# Patient Record
Sex: MALE | Race: WHITE | NOT HISPANIC OR LATINO | Employment: FULL TIME | URBAN - NONMETROPOLITAN AREA
[De-identification: names, ages, dates, MRNs, and addresses within clinical notes are randomized per-mention and may not be internally consistent; named-entity substitution may affect disease eponyms.]

---

## 2017-01-23 ENCOUNTER — OFFICE VISIT (OUTPATIENT)
Dept: FAMILY MEDICINE CLINIC | Facility: CLINIC | Age: 52
End: 2017-01-23

## 2017-01-23 VITALS
TEMPERATURE: 97.8 F | HEIGHT: 66 IN | HEART RATE: 71 BPM | BODY MASS INDEX: 34.81 KG/M2 | SYSTOLIC BLOOD PRESSURE: 140 MMHG | OXYGEN SATURATION: 96 % | WEIGHT: 216.6 LBS | DIASTOLIC BLOOD PRESSURE: 80 MMHG

## 2017-01-23 DIAGNOSIS — J06.9 ACUTE URI: Primary | ICD-10-CM

## 2017-01-23 DIAGNOSIS — R05.9 COUGH: ICD-10-CM

## 2017-01-23 DIAGNOSIS — R50.9 FEVER, UNSPECIFIED FEVER CAUSE: ICD-10-CM

## 2017-01-23 LAB
FLUAV AG NPH QL: NEGATIVE
FLUBV AG NPH QL IA: NEGATIVE

## 2017-01-23 PROCEDURE — 99213 OFFICE O/P EST LOW 20 MIN: CPT | Performed by: NURSE PRACTITIONER

## 2017-01-23 PROCEDURE — 87804 INFLUENZA ASSAY W/OPTIC: CPT | Performed by: NURSE PRACTITIONER

## 2017-01-23 RX ORDER — AZITHROMYCIN 250 MG/1
TABLET, FILM COATED ORAL
Qty: 6 TABLET | Refills: 0 | Status: SHIPPED | OUTPATIENT
Start: 2017-01-23 | End: 2017-05-02

## 2017-01-23 RX ORDER — METHYLPREDNISOLONE 4 MG/1
TABLET ORAL
Qty: 21 TABLET | Refills: 0 | Status: SHIPPED | OUTPATIENT
Start: 2017-01-23 | End: 2017-04-11

## 2017-01-23 RX ORDER — PROMETHAZINE HYDROCHLORIDE AND CODEINE PHOSPHATE 6.25; 1 MG/5ML; MG/5ML
5 SYRUP ORAL EVERY 4 HOURS PRN
Qty: 240 ML | Refills: 0 | Status: SHIPPED | OUTPATIENT
Start: 2017-01-23 | End: 2017-04-11

## 2017-01-23 NOTE — PROGRESS NOTES
Subjective   Sharan Gomez is a 51 y.o. male. Patient here today with complaints of URI  pt here today with complaints of cough, started last week but worsened on Saturday. Has had low grade fever, did not have flu shot, is having body aches, cough is nonproductive, having headache. Does not smoke.     Vitals:    01/23/17 1410   BP: 140/80   Pulse: 71   Temp: 97.8 °F (36.6 °C)   SpO2: 96%     Past Medical History   Diagnosis Date   • Elevated blood pressure    • Encounter for general adult medical examination without abnormal findings    • Essential hypertension    • Hyperlipidemia    • Kidney stone    • Left flank pain    • Low back pain    • Muscle strain    • Type 2 diabetes mellitus      URI    This is a new problem. The current episode started in the past 7 days. The problem has been gradually worsening. There has been no fever. Associated symptoms include congestion, coughing, headaches, rhinorrhea and a sore throat. Pertinent negatives include no abdominal pain, chest pain, diarrhea, dysuria, ear pain, joint pain, joint swelling, nausea, neck pain, plugged ear sensation, rash, sinus pain, sneezing, swollen glands, vomiting or wheezing. He has tried nothing for the symptoms. The treatment provided no relief.   Cough   This is a new problem. The current episode started in the past 7 days. The problem has been gradually worsening. The problem occurs every few minutes. The cough is non-productive. Associated symptoms include a fever, headaches, myalgias, nasal congestion, rhinorrhea and a sore throat. Pertinent negatives include no chest pain, chills, ear congestion, ear pain, heartburn, hemoptysis, postnasal drip, rash, shortness of breath, sweats, weight loss or wheezing. Nothing aggravates the symptoms. He has tried nothing for the symptoms. The treatment provided no relief. His past medical history is significant for pneumonia. There is no history of asthma, bronchiectasis, bronchitis, COPD, emphysema or  environmental allergies.        The following portions of the patient's history were reviewed and updated as appropriate: allergies, current medications, past family history, past medical history, past social history, past surgical history and problem list.    Review of Systems   Constitutional: Positive for fever. Negative for chills and weight loss.   HENT: Positive for congestion, rhinorrhea and sore throat. Negative for ear pain, postnasal drip and sneezing.    Eyes: Negative.    Respiratory: Positive for cough. Negative for hemoptysis, shortness of breath and wheezing.    Cardiovascular: Negative.  Negative for chest pain.   Gastrointestinal: Negative.  Negative for abdominal pain, diarrhea, heartburn, nausea and vomiting.   Endocrine: Negative.    Genitourinary: Negative.  Negative for dysuria.   Musculoskeletal: Positive for myalgias. Negative for joint pain and neck pain.   Skin: Negative.  Negative for rash.   Allergic/Immunologic: Negative.  Negative for environmental allergies.   Neurological: Positive for headaches.   Hematological: Negative.    Psychiatric/Behavioral: Negative.        Objective   Physical Exam   Constitutional: He is oriented to person, place, and time. He appears well-developed and well-nourished. No distress.   HENT:   Head: Normocephalic and atraumatic.   Right Ear: Hearing, external ear and ear canal normal. No drainage, swelling or tenderness. A middle ear effusion is present. No decreased hearing is noted.   Left Ear: Hearing, external ear and ear canal normal. No drainage, swelling or tenderness. A middle ear effusion is present. No decreased hearing is noted.   Nose: Mucosal edema present. Right sinus exhibits no maxillary sinus tenderness and no frontal sinus tenderness. Left sinus exhibits no maxillary sinus tenderness and no frontal sinus tenderness.   Mouth/Throat: Uvula is midline and mucous membranes are normal. Posterior oropharyngeal erythema present. No tonsillar  exudate.   Eyes: Right eye exhibits no discharge. Left eye exhibits no discharge.   Neck: Neck supple.   Cardiovascular: Normal rate, regular rhythm and normal heart sounds.  Exam reveals no gallop and no friction rub.    No murmur heard.  Pulmonary/Chest: Effort normal. No respiratory distress. He has decreased breath sounds in the right upper field, the right middle field, the right lower field, the left upper field, the left middle field and the left lower field. He has no wheezes. He has no rales.   Musculoskeletal: Normal range of motion.   Lymphadenopathy:     He has cervical adenopathy.   Neurological: He is alert and oriented to person, place, and time.   Skin: Skin is warm and dry. No rash noted. He is not diaphoretic. No erythema. No pallor.   Psychiatric: He has a normal mood and affect. His behavior is normal. Judgment and thought content normal.   Nursing note and vitals reviewed.      Assessment/Plan   Sharan was seen today for uri.    Diagnoses and all orders for this visit:    Acute URI    Fever, unspecified fever cause  -     Cancel: Influenza A & B, RT PCR  -     Cancel: Influenza Antigen; Future  -     Influenza Antigen  -     Influenza Antigen  -     XR Chest PA & Lateral    Cough    Other orders  -     azithromycin (ZITHROMAX Z-ELISA) 250 MG tablet; Take 2 tablets the first day, then 1 tablet daily for 4 days.  -     MethylPREDNISolone (MEDROL, ELISA,) 4 MG tablet; Take as directed on package instructions.  -     promethazine-codeine (PHENERGAN with CODEINE) 6.25-10 MG/5ML syrup; Take 5 mL by mouth Every 4 (Four) Hours As Needed for cough.        Flu swab is obtained and is negative.  I will obtain chest x-ray.  I will treat him with Zithromax as well as prednisone.  I will advised that he not work today and push fluids, rest.  If his symptoms should persist or worsen he is to return to clinic for follow-up.  He is aware.  Will otherwise return to clinic as scheduled for chronic conditions.  All  questions and concerns are addressed with understanding noted. The patient is in agreement to above plan.

## 2017-01-23 NOTE — MR AVS SNAPSHOT
Sharan Gomez   1/23/2017 2:15 PM   Office Visit    Dept Phone:  [phone]   Encounter #:  [id number]    Provider:  BRYANT Booth   Department:  [facility]                Your Full Care Plan              Today's Medication Changes          These changes are accurate as of: 1/23/17  3:14 PM.  If you have any questions, ask your nurse or doctor.               New Medication(s)Ordered:     azithromycin 250 MG tablet   Commonly known as:  ZITHROMAX Z-ELISA   Take 2 tablets the first day, then 1 tablet daily for 4 days.   Started by:  BRYANT Booth       MethylPREDNISolone 4 MG tablet   Commonly known as:  MEDROL (ELISA)   Take as directed on package instructions.   Started by:  BRYANT Booth       promethazine-codeine 6.25-10 MG/5ML syrup   Commonly known as:  PHENERGAN with CODEINE   Take 5 mL by mouth Every 4 (Four) Hours As Needed for cough.   Started by:  BRYANT Booth            Where to Get Your Medications      These medications were sent to [facility] [address] [phone] [place] [phone] [address] [address]     Phone:  [phone]     azithromycin 250 MG tablet    MethylPREDNISolone 4 MG tablet         You can get these medications from any pharmacy     Bring a paper prescription for each of these medications     promethazine-codeine 6.25-10 MG/5ML syrup                  Your Updated Medication List          This list is accurate as of: 1/23/17  3:14 PM.  Always use your most recent med list.                ACURA BLOOD GLUCOSE METER W/DEVICE kit       atorvastatin 20 MG tablet   Commonly known as:  LIPITOR   Take 1 tablet by mouth daily.       azithromycin 250 MG tablet   Commonly known as:  ZITHROMAX Z-ELISA   Take 2 tablets the first day, then 1 tablet daily for 4 days.       glimepiride 4 MG tablet   Commonly known as:  AMARYL       glucose blood test  strip       Lancets misc       MethylPREDNISolone 4 MG tablet   Commonly known as:  MEDROL (ELISA)   Take as directed on package instructions.       promethazine-codeine 6.25-10 MG/5ML syrup   Commonly known as:  PHENERGAN with CODEINE   Take 5 mL by mouth Every 4 (Four) Hours As Needed for cough.       valsartan 80 MG tablet   Commonly known as:  DIOVAN   Take 1 tablet by mouth daily.               We Performed the Following     Influenza Antigen     Influenza Antigen     XR Chest PA & Lateral       You Were Diagnosed With        Codes Comments    Fever, unspecified fever cause    -  Primary ICD-10-CM: R50.9  ICD-9-CM: 780.60       Medications to be Given to You by a Medical Professional     Due       Frequency    10/6/2016 cyanocobalamin injection 1,000 mcg  Every 28 Days      Instructions     None    Patient Instructions History      Upcoming Appointments     Visit Type Date Time Department    OFFICE VISIT 2017  2:15 PM [facility]      Picwing Signup     [facility] Picwing allows you to send messages to your doctor, view your test results, renew your prescriptions, schedule appointments, and more. To sign up, go to EverTrue and click on the Sign Up Now link in the New User? box. Enter your Picwing Activation Code exactly as it appears below along with the last four digits of your Social Security Number and your Date of Birth () to complete the sign-up process. If you do not sign up before the expiration date, you must request a new code.    Picwing Activation Code: [email or web address]  Expires: 2017  3:14 PM    If you have questions, you can email [email or web address] or call [phone] to talk to our Picwing staff. Remember, Picwing is NOT to be used for urgent needs. For medical emergencies, dial 911.               Other Info from Your Visit           Allergies     Avelox [Moxifloxacin]      Crestor [Rosuvastatin Calcium]      Pravachol [Pravastatin Sodium]       "  Reason for Visit     URI           Vital Signs     Blood Pressure Pulse Temperature Height Weight Oxygen Saturation    140/80 71 97.8 °F (36.6 °C) 66\" (167.6 cm) 216 lb 9.6 oz (98.2 kg) 96%    Body Mass Index Smoking Status                34.96 kg/m2 Never Smoker          Problems and Diagnoses Noted     Fever    -  Primary      Results         "

## 2017-04-25 DIAGNOSIS — E11.9 TYPE 2 DIABETES MELLITUS WITHOUT COMPLICATION, WITHOUT LONG-TERM CURRENT USE OF INSULIN (HCC): Primary | ICD-10-CM

## 2017-04-28 LAB
ALBUMIN SERPL-MCNC: 4.8 G/DL (ref 3.2–5.5)
ALBUMIN/GLOB SERPL: 1.4 G/DL (ref 1–3)
ALP SERPL-CCNC: 73 U/L (ref 15–121)
ALT SERPL W P-5'-P-CCNC: 39 U/L (ref 10–60)
ANION GAP SERPL CALCULATED.3IONS-SCNC: 9 MMOL/L (ref 5–15)
AST SERPL-CCNC: 27 U/L (ref 10–60)
BASOPHILS # BLD AUTO: 0.05 10*3/MM3 (ref 0–0.2)
BASOPHILS NFR BLD AUTO: 0.4 % (ref 0–2)
BILIRUB SERPL-MCNC: 3 MG/DL (ref 0.2–1)
BUN BLD-MCNC: 19 MG/DL (ref 8–25)
BUN/CREAT SERPL: 19 (ref 7–25)
CALCIUM SPEC-SCNC: 9.3 MG/DL (ref 8.4–10.8)
CHLORIDE SERPL-SCNC: 102 MMOL/L (ref 100–112)
CHOLEST SERPL-MCNC: 125 MG/DL (ref 150–200)
CO2 SERPL-SCNC: 29 MMOL/L (ref 20–32)
CREAT BLD-MCNC: 1 MG/DL (ref 0.4–1.3)
DEPRECATED RDW RBC AUTO: 39.2 FL (ref 35.1–43.9)
EOSINOPHIL # BLD AUTO: 0.19 10*3/MM3 (ref 0–0.7)
EOSINOPHIL NFR BLD AUTO: 1.7 % (ref 0–7)
ERYTHROCYTE [DISTWIDTH] IN BLOOD BY AUTOMATED COUNT: 12.4 % (ref 11.5–14.5)
GFR SERPL CREATININE-BSD FRML MDRD: 79 ML/MIN/1.73 (ref 63–147)
GLOBULIN UR ELPH-MCNC: 3.5 GM/DL (ref 2.5–4.6)
GLUCOSE BLD-MCNC: 99 MG/DL (ref 70–100)
HBA1C MFR BLD: 7.14 % (ref 4–5.6)
HCT VFR BLD AUTO: 47 % (ref 39–49)
HDLC SERPL-MCNC: 39 MG/DL (ref 35–100)
HGB BLD-MCNC: 16.4 G/DL (ref 13.7–17.3)
LDLC SERPL CALC-MCNC: 75 MG/DL
LDLC/HDLC SERPL: 1.92 {RATIO}
LYMPHOCYTES # BLD AUTO: 3.45 10*3/MM3 (ref 0.6–4.2)
LYMPHOCYTES NFR BLD AUTO: 30.4 % (ref 10–50)
MCH RBC QN AUTO: 30.4 PG (ref 26.5–34)
MCHC RBC AUTO-ENTMCNC: 34.9 G/DL (ref 31.5–36.3)
MCV RBC AUTO: 87.2 FL (ref 80–98)
MONOCYTES # BLD AUTO: 1.28 10*3/MM3 (ref 0–0.9)
MONOCYTES NFR BLD AUTO: 11.3 % (ref 0–12)
NEUTROPHILS # BLD AUTO: 6.38 10*3/MM3 (ref 2–8.6)
NEUTROPHILS NFR BLD AUTO: 56.2 % (ref 37–80)
PLATELET # BLD AUTO: 270 10*3/MM3 (ref 150–450)
PMV BLD AUTO: 9.4 FL (ref 8–12)
POTASSIUM BLD-SCNC: 4 MMOL/L (ref 3.4–5.4)
PROT SERPL-MCNC: 8.3 G/DL (ref 6.7–8.2)
RBC # BLD AUTO: 5.39 10*6/MM3 (ref 4.37–5.74)
SODIUM BLD-SCNC: 140 MMOL/L (ref 134–146)
T4 SERPL-MCNC: 10.5 MCG/DL (ref 5.5–11)
TRIGL SERPL-MCNC: 56 MG/DL (ref 35–160)
TSH SERPL DL<=0.05 MIU/L-ACNC: 1.38 MIU/ML (ref 0.46–4.68)
VLDLC SERPL-MCNC: 11.2 MG/DL
WBC NRBC COR # BLD: 11.35 10*3/MM3 (ref 3.2–9.8)

## 2017-04-28 PROCEDURE — 84481 FREE ASSAY (FT-3): CPT | Performed by: NURSE PRACTITIONER

## 2017-04-28 PROCEDURE — 80061 LIPID PANEL: CPT | Performed by: NURSE PRACTITIONER

## 2017-04-28 PROCEDURE — 80053 COMPREHEN METABOLIC PANEL: CPT | Performed by: NURSE PRACTITIONER

## 2017-04-28 PROCEDURE — 85025 COMPLETE CBC W/AUTO DIFF WBC: CPT | Performed by: NURSE PRACTITIONER

## 2017-04-28 PROCEDURE — 84443 ASSAY THYROID STIM HORMONE: CPT | Performed by: NURSE PRACTITIONER

## 2017-04-28 PROCEDURE — 36415 COLL VENOUS BLD VENIPUNCTURE: CPT | Performed by: NURSE PRACTITIONER

## 2017-04-28 PROCEDURE — 83036 HEMOGLOBIN GLYCOSYLATED A1C: CPT | Performed by: NURSE PRACTITIONER

## 2017-04-28 PROCEDURE — 84436 ASSAY OF TOTAL THYROXINE: CPT | Performed by: NURSE PRACTITIONER

## 2017-04-30 LAB — T3FREE SERPL-MCNC: 3.8 PG/ML (ref 2–4.4)

## 2017-05-02 ENCOUNTER — OFFICE VISIT (OUTPATIENT)
Dept: FAMILY MEDICINE CLINIC | Facility: CLINIC | Age: 52
End: 2017-05-02

## 2017-05-02 VITALS
WEIGHT: 207.6 LBS | HEART RATE: 83 BPM | TEMPERATURE: 97.6 F | SYSTOLIC BLOOD PRESSURE: 120 MMHG | DIASTOLIC BLOOD PRESSURE: 72 MMHG | BODY MASS INDEX: 33.37 KG/M2 | HEIGHT: 66 IN

## 2017-05-02 DIAGNOSIS — I10 ESSENTIAL HYPERTENSION: Chronic | ICD-10-CM

## 2017-05-02 DIAGNOSIS — E11.69 TYPE 2 DIABETES MELLITUS WITH OTHER SPECIFIED COMPLICATION (HCC): Primary | Chronic | ICD-10-CM

## 2017-05-02 DIAGNOSIS — C64.9 MALIGNANT NEOPLASM OF KIDNEY, UNSPECIFIED LATERALITY (HCC): ICD-10-CM

## 2017-05-02 DIAGNOSIS — E78.5 HYPERLIPIDEMIA, UNSPECIFIED HYPERLIPIDEMIA TYPE: Chronic | ICD-10-CM

## 2017-05-02 PROCEDURE — 99214 OFFICE O/P EST MOD 30 MIN: CPT | Performed by: NURSE PRACTITIONER

## 2017-05-02 RX ORDER — ATORVASTATIN CALCIUM 20 MG/1
20 TABLET, FILM COATED ORAL DAILY
Qty: 30 TABLET | Refills: 5 | Status: SHIPPED | OUTPATIENT
Start: 2017-05-02 | End: 2017-11-27 | Stop reason: SDUPTHER

## 2017-05-02 RX ORDER — VALSARTAN 80 MG/1
80 TABLET ORAL DAILY
Qty: 30 TABLET | Refills: 5 | Status: SHIPPED | OUTPATIENT
Start: 2017-05-02 | End: 2017-11-27 | Stop reason: SDUPTHER

## 2017-05-02 RX ORDER — GLIMEPIRIDE 4 MG/1
2 TABLET ORAL DAILY
Qty: 30 TABLET | Refills: 5 | Status: SHIPPED | OUTPATIENT
Start: 2017-05-02 | End: 2017-10-11

## 2017-08-15 ENCOUNTER — TRANSCRIBE ORDERS (OUTPATIENT)
Dept: LAB | Facility: OTHER | Age: 52
End: 2017-08-15

## 2017-08-15 DIAGNOSIS — R97.20 ELEVATED PSA: Primary | ICD-10-CM

## 2017-08-22 ENCOUNTER — LAB (OUTPATIENT)
Dept: LAB | Facility: OTHER | Age: 52
End: 2017-08-22

## 2017-08-22 DIAGNOSIS — R97.20 ELEVATED PSA: ICD-10-CM

## 2017-08-22 PROCEDURE — 36415 COLL VENOUS BLD VENIPUNCTURE: CPT | Performed by: INTERNAL MEDICINE

## 2017-08-22 PROCEDURE — 84153 ASSAY OF PSA TOTAL: CPT | Performed by: UROLOGY

## 2017-08-23 LAB — PSA SERPL-MCNC: 1.23 NG/ML (ref 0–4)

## 2017-10-11 ENCOUNTER — OFFICE VISIT (OUTPATIENT)
Dept: FAMILY MEDICINE CLINIC | Facility: CLINIC | Age: 52
End: 2017-10-11

## 2017-10-11 VITALS
HEART RATE: 82 BPM | HEIGHT: 66 IN | DIASTOLIC BLOOD PRESSURE: 82 MMHG | SYSTOLIC BLOOD PRESSURE: 144 MMHG | WEIGHT: 208 LBS | BODY MASS INDEX: 33.43 KG/M2

## 2017-10-11 DIAGNOSIS — I10 ESSENTIAL HYPERTENSION: ICD-10-CM

## 2017-10-11 DIAGNOSIS — Z51.89 ENCOUNTER FOR MEDICATION ADJUSTMENT: ICD-10-CM

## 2017-10-11 DIAGNOSIS — Z71.89 ENCOUNTER FOR DIABETES EDUCATION: ICD-10-CM

## 2017-10-11 DIAGNOSIS — E11.65 TYPE 2 DIABETES MELLITUS WITH HYPERGLYCEMIA, WITHOUT LONG-TERM CURRENT USE OF INSULIN (HCC): Primary | ICD-10-CM

## 2017-10-11 PROCEDURE — 99214 OFFICE O/P EST MOD 30 MIN: CPT | Performed by: FAMILY MEDICINE

## 2017-10-11 RX ORDER — GLIMEPIRIDE 4 MG/1
4 TABLET ORAL DAILY
Qty: 30 TABLET | Refills: 5
Start: 2017-10-11 | End: 2018-02-19 | Stop reason: SDUPTHER

## 2017-10-16 ENCOUNTER — LAB (OUTPATIENT)
Dept: LAB | Facility: OTHER | Age: 52
End: 2017-10-16

## 2017-10-16 ENCOUNTER — OFFICE VISIT (OUTPATIENT)
Dept: FAMILY MEDICINE CLINIC | Facility: CLINIC | Age: 52
End: 2017-10-16

## 2017-10-16 VITALS
HEART RATE: 77 BPM | DIASTOLIC BLOOD PRESSURE: 76 MMHG | WEIGHT: 208.5 LBS | BODY MASS INDEX: 33.51 KG/M2 | HEIGHT: 66 IN | SYSTOLIC BLOOD PRESSURE: 130 MMHG

## 2017-10-16 DIAGNOSIS — E78.5 HYPERLIPIDEMIA, UNSPECIFIED HYPERLIPIDEMIA TYPE: Chronic | ICD-10-CM

## 2017-10-16 DIAGNOSIS — E11.9 TYPE 2 DIABETES MELLITUS WITHOUT COMPLICATION, WITHOUT LONG-TERM CURRENT USE OF INSULIN (HCC): Primary | Chronic | ICD-10-CM

## 2017-10-16 DIAGNOSIS — I10 ESSENTIAL HYPERTENSION: Chronic | ICD-10-CM

## 2017-10-16 DIAGNOSIS — C64.1 MALIGNANT NEOPLASM OF RIGHT KIDNEY (HCC): ICD-10-CM

## 2017-10-16 DIAGNOSIS — E11.9 TYPE 2 DIABETES MELLITUS WITHOUT COMPLICATION, WITHOUT LONG-TERM CURRENT USE OF INSULIN (HCC): ICD-10-CM

## 2017-10-16 LAB
ALBUMIN SERPL-MCNC: 4.6 G/DL (ref 3.2–5.5)
ALBUMIN/GLOB SERPL: 1.2 G/DL (ref 1–3)
ALP SERPL-CCNC: 70 U/L (ref 15–121)
ALT SERPL W P-5'-P-CCNC: 71 U/L (ref 10–60)
ANION GAP SERPL CALCULATED.3IONS-SCNC: 10 MMOL/L (ref 5–15)
AST SERPL-CCNC: 42 U/L (ref 10–60)
BASOPHILS # BLD AUTO: 0.05 10*3/MM3 (ref 0–0.2)
BASOPHILS NFR BLD AUTO: 0.6 % (ref 0–2)
BILIRUB SERPL-MCNC: 2.1 MG/DL (ref 0.2–1)
BUN BLD-MCNC: 14 MG/DL (ref 8–25)
BUN/CREAT SERPL: 12.7 (ref 7–25)
CALCIUM SPEC-SCNC: 9.5 MG/DL (ref 8.4–10.8)
CHLORIDE SERPL-SCNC: 100 MMOL/L (ref 100–112)
CHOLEST SERPL-MCNC: 135 MG/DL (ref 150–200)
CO2 SERPL-SCNC: 28 MMOL/L (ref 20–32)
CREAT BLD-MCNC: 1.1 MG/DL (ref 0.4–1.3)
DEPRECATED RDW RBC AUTO: 39.8 FL (ref 35.1–43.9)
EOSINOPHIL # BLD AUTO: 0.29 10*3/MM3 (ref 0–0.7)
EOSINOPHIL NFR BLD AUTO: 3.4 % (ref 0–7)
ERYTHROCYTE [DISTWIDTH] IN BLOOD BY AUTOMATED COUNT: 12.4 % (ref 11.5–14.5)
GFR SERPL CREATININE-BSD FRML MDRD: 71 ML/MIN/1.73 (ref 56–130)
GLOBULIN UR ELPH-MCNC: 3.7 GM/DL (ref 2.5–4.6)
GLUCOSE BLD-MCNC: 156 MG/DL (ref 70–100)
GLUCOSE BLDC GLUCOMTR-MCNC: 124 MG/DL (ref 70–130)
HCT VFR BLD AUTO: 49.6 % (ref 39–49)
HDLC SERPL-MCNC: 35 MG/DL (ref 35–100)
HGB BLD-MCNC: 17.3 G/DL (ref 13.7–17.3)
LDLC SERPL CALC-MCNC: 74 MG/DL
LDLC/HDLC SERPL: 2.11 {RATIO}
LYMPHOCYTES # BLD AUTO: 2.53 10*3/MM3 (ref 0.6–4.2)
LYMPHOCYTES NFR BLD AUTO: 29.4 % (ref 10–50)
MCH RBC QN AUTO: 30.8 PG (ref 26.5–34)
MCHC RBC AUTO-ENTMCNC: 34.9 G/DL (ref 31.5–36.3)
MCV RBC AUTO: 88.4 FL (ref 80–98)
MONOCYTES # BLD AUTO: 0.95 10*3/MM3 (ref 0–0.9)
MONOCYTES NFR BLD AUTO: 11 % (ref 0–12)
NEUTROPHILS # BLD AUTO: 4.8 10*3/MM3 (ref 2–8.6)
NEUTROPHILS NFR BLD AUTO: 55.6 % (ref 37–80)
PLATELET # BLD AUTO: 273 10*3/MM3 (ref 150–450)
PMV BLD AUTO: 10 FL (ref 8–12)
POTASSIUM BLD-SCNC: 4.5 MMOL/L (ref 3.4–5.4)
PROT SERPL-MCNC: 8.3 G/DL (ref 6.7–8.2)
RBC # BLD AUTO: 5.61 10*6/MM3 (ref 4.37–5.74)
SODIUM BLD-SCNC: 138 MMOL/L (ref 134–146)
TRIGL SERPL-MCNC: 130 MG/DL (ref 35–160)
VLDLC SERPL-MCNC: 26 MG/DL
WBC NRBC COR # BLD: 8.62 10*3/MM3 (ref 3.2–9.8)

## 2017-10-16 PROCEDURE — 99214 OFFICE O/P EST MOD 30 MIN: CPT | Performed by: NURSE PRACTITIONER

## 2017-10-16 PROCEDURE — 36415 COLL VENOUS BLD VENIPUNCTURE: CPT | Performed by: NURSE PRACTITIONER

## 2017-10-16 PROCEDURE — 80061 LIPID PANEL: CPT | Performed by: NURSE PRACTITIONER

## 2017-10-16 PROCEDURE — 80053 COMPREHEN METABOLIC PANEL: CPT | Performed by: NURSE PRACTITIONER

## 2017-10-16 PROCEDURE — 83036 HEMOGLOBIN GLYCOSYLATED A1C: CPT | Performed by: NURSE PRACTITIONER

## 2017-10-16 PROCEDURE — 85025 COMPLETE CBC W/AUTO DIFF WBC: CPT | Performed by: NURSE PRACTITIONER

## 2017-10-16 PROCEDURE — 82962 GLUCOSE BLOOD TEST: CPT | Performed by: NURSE PRACTITIONER

## 2017-10-17 LAB — HBA1C MFR BLD: 8.6 % (ref 4–5.6)

## 2017-10-24 NOTE — PROGRESS NOTES
Subjective   Sharan Gomez is a 52 y.o. male. Patient here today with complaints of Hyperglycemia  Patient here today for recheck of diabetes mellitus, reports blood sugar has been elevated to greater than 450.  Apparently he has had some medication changes/stopped Amaryl for a week and his blood sugar spiked.  Since he has been back on Amaryl his blood sugar this morning he reports is 137 and the highest his blood sugar has been is been 170s.  He works third shift.  Also has history of hypertension and hyperlipidemia, controlled on medications currently.  Denies chest pain shortness of breath or headache. He cont to see urology as scheduled for hx renal carcinoma with hx surgery.     Vitals:    10/16/17 1316   BP: 130/76   Pulse: 77     Past Medical History:   Diagnosis Date   • Elevated blood pressure    • Encounter for general adult medical examination without abnormal findings    • Essential hypertension    • Hyperlipidemia    • Kidney stone    • Left flank pain    • Low back pain    • Muscle strain    • Type 2 diabetes mellitus      Diabetes   He presents for his follow-up diabetic visit. He has type 2 diabetes mellitus. His disease course has been fluctuating. Associated symptoms include fatigue and weakness. Pertinent negatives for diabetes include no weight loss. There are no hypoglycemic complications. Symptoms are worsening. Risk factors for coronary artery disease include obesity, male sex, hypertension, dyslipidemia and diabetes mellitus. Current diabetic treatment includes diet and oral agent (monotherapy). He is compliant with treatment most of the time. His weight is stable. His home blood glucose trend is fluctuating minimally (was 137 this am but reports was > 450 last week when he had been off of his meds, off of amaryl). His breakfast blood glucose range is generally 130-140 mg/dl. An ACE inhibitor/angiotensin II receptor blocker is being taken.        The following portions of the patient's  history were reviewed and updated as appropriate: allergies, current medications, past family history, past medical history, past social history, past surgical history and problem list.    Review of Systems   Constitutional: Positive for fatigue. Negative for weight loss.   HENT: Negative.    Eyes: Negative.    Respiratory: Negative.    Cardiovascular: Negative.    Gastrointestinal: Negative.    Endocrine: Negative.    Genitourinary: Negative.    Musculoskeletal: Negative.    Skin: Negative.    Allergic/Immunologic: Negative.    Neurological: Positive for weakness.   Hematological: Negative.    Psychiatric/Behavioral: Negative.        Objective   Physical Exam   Constitutional: He is oriented to person, place, and time. He appears well-developed and well-nourished. No distress.   HENT:   Head: Normocephalic and atraumatic.   Neck: Neck supple. Normal carotid pulses present. Carotid bruit is not present.   Cardiovascular: Normal rate, regular rhythm and normal heart sounds.  Exam reveals no gallop and no friction rub.    No murmur heard.  Pulmonary/Chest: Effort normal and breath sounds normal. No respiratory distress. He has no wheezes. He has no rales.   Musculoskeletal: Normal range of motion. He exhibits no edema.   Neurological: He is alert and oriented to person, place, and time.   Skin: Skin is warm and dry. No rash noted. He is not diaphoretic. No erythema. No pallor.   Psychiatric: He has a normal mood and affect. His behavior is normal. Judgment and thought content normal.   Nursing note and vitals reviewed.      Assessment/Plan   Sharan was seen today for hyperglycemia.    Diagnoses and all orders for this visit:    Type 2 diabetes mellitus without complication, without long-term current use of insulin  Comments:  uncontrolled  Orders:  -     POC Glucose Fingerstick  -     Lipid Panel; Future  -     CBC & Differential; Future  -     Hemoglobin A1c; Future  -     Comprehensive Metabolic Panel;  Future    Malignant neoplasm of right kidney  Comments:  hx of, cont to see dr yariel flores, urology as scheduled    Essential hypertension    Hyperlipidemia, unspecified hyperlipidemia type    We did have a long discussion about his medication and how he is supposed to take it and he will get back on Amaryl 2 mg daily.  If this does not lower his blood sugar then he will increase to 4 mg daily.  He needs to return to clinic in one month for recheck or sooner as needed.  Blood sugar in office is 124.  We also discussed better diabetic diet and exercise regimen.  I will obtain labs as above and inform him of results.  All questions and concerns are addressed with understanding noted.  He is aware and is in agreement to above plan.

## 2017-11-13 ENCOUNTER — TELEPHONE (OUTPATIENT)
Dept: FAMILY MEDICINE CLINIC | Facility: CLINIC | Age: 52
End: 2017-11-13

## 2017-11-28 RX ORDER — ATORVASTATIN CALCIUM 20 MG/1
TABLET, FILM COATED ORAL
Qty: 30 TABLET | Refills: 5 | Status: SHIPPED | OUTPATIENT
Start: 2017-11-28 | End: 2018-04-10 | Stop reason: SDUPTHER

## 2017-11-28 RX ORDER — VALSARTAN 80 MG/1
TABLET ORAL
Qty: 30 TABLET | Refills: 5 | Status: SHIPPED | OUTPATIENT
Start: 2017-11-28 | End: 2018-04-10 | Stop reason: SDUPTHER

## 2018-02-19 DIAGNOSIS — E11.9 TYPE 2 DIABETES MELLITUS WITHOUT COMPLICATION, WITHOUT LONG-TERM CURRENT USE OF INSULIN (HCC): Primary | ICD-10-CM

## 2018-02-19 RX ORDER — GLIMEPIRIDE 4 MG/1
4 TABLET ORAL DAILY
Qty: 30 TABLET | Refills: 5
Start: 2018-02-19 | End: 2018-03-03 | Stop reason: SDUPTHER

## 2018-02-24 ENCOUNTER — APPOINTMENT (OUTPATIENT)
Dept: GENERAL RADIOLOGY | Facility: HOSPITAL | Age: 53
End: 2018-02-24

## 2018-02-24 ENCOUNTER — HOSPITAL ENCOUNTER (OUTPATIENT)
Facility: HOSPITAL | Age: 53
Setting detail: OBSERVATION
Discharge: HOME | End: 2018-02-25
Attending: FAMILY MEDICINE | Admitting: FAMILY MEDICINE

## 2018-02-24 DIAGNOSIS — R07.2 PRECORDIAL PAIN: Primary | ICD-10-CM

## 2018-02-24 LAB
ALBUMIN SERPL-MCNC: 4.4 G/DL (ref 3.4–4.8)
ALBUMIN/GLOB SERPL: 1.3 G/DL (ref 1.1–1.8)
ALP SERPL-CCNC: 78 U/L (ref 38–126)
ALT SERPL W P-5'-P-CCNC: 76 U/L (ref 21–72)
AMYLASE SERPL-CCNC: 88 U/L (ref 50–130)
ANION GAP SERPL CALCULATED.3IONS-SCNC: 14 MMOL/L (ref 5–15)
AST SERPL-CCNC: 33 U/L (ref 17–59)
BASOPHILS # BLD AUTO: 0.04 10*3/MM3 (ref 0–0.2)
BASOPHILS NFR BLD AUTO: 0.5 % (ref 0–2)
BILIRUB SERPL-MCNC: 1.9 MG/DL (ref 0.2–1.3)
BUN BLD-MCNC: 14 MG/DL (ref 7–21)
BUN/CREAT SERPL: 14.1 (ref 7–25)
CALCIUM SPEC-SCNC: 8.9 MG/DL (ref 8.4–10.2)
CHLORIDE SERPL-SCNC: 101 MMOL/L (ref 95–110)
CK MB SERPL-CCNC: 0.7 NG/ML (ref 0–5)
CK MB SERPL-CCNC: 0.86 NG/ML (ref 0–5)
CK SERPL-CCNC: 60 U/L (ref 55–170)
CK SERPL-CCNC: 73 U/L (ref 55–170)
CO2 SERPL-SCNC: 24 MMOL/L (ref 22–31)
CREAT BLD-MCNC: 0.99 MG/DL (ref 0.7–1.3)
DEPRECATED RDW RBC AUTO: 36.6 FL (ref 35.1–43.9)
EOSINOPHIL # BLD AUTO: 0.24 10*3/MM3 (ref 0–0.7)
EOSINOPHIL NFR BLD AUTO: 3 % (ref 0–7)
ERYTHROCYTE [DISTWIDTH] IN BLOOD BY AUTOMATED COUNT: 12 % (ref 11.5–14.5)
FOLATE SERPL-MCNC: 7.88 NG/ML (ref 2.76–21)
GFR SERPL CREATININE-BSD FRML MDRD: 80 ML/MIN/1.73 (ref 60–130)
GLOBULIN UR ELPH-MCNC: 3.3 GM/DL (ref 2.3–3.5)
GLUCOSE BLD-MCNC: 212 MG/DL (ref 60–100)
GLUCOSE BLDC GLUCOMTR-MCNC: 222 MG/DL (ref 70–130)
HCT VFR BLD AUTO: 44.8 % (ref 39–49)
HGB BLD-MCNC: 16.2 G/DL (ref 13.7–17.3)
HOLD SPECIMEN: NORMAL
HOLD SPECIMEN: NORMAL
IMM GRANULOCYTES # BLD: 0.03 10*3/MM3 (ref 0–0.02)
IMM GRANULOCYTES NFR BLD: 0.4 % (ref 0–0.5)
INR PPP: 0.97 (ref 0.8–1.2)
LYMPHOCYTES # BLD AUTO: 2.44 10*3/MM3 (ref 0.6–4.2)
LYMPHOCYTES NFR BLD AUTO: 30.2 % (ref 10–50)
MAGNESIUM SERPL-MCNC: 1.9 MG/DL (ref 1.6–2.3)
MCH RBC QN AUTO: 30.4 PG (ref 26.5–34)
MCHC RBC AUTO-ENTMCNC: 36.2 G/DL (ref 31.5–36.3)
MCV RBC AUTO: 84.1 FL (ref 80–98)
MONOCYTES # BLD AUTO: 0.95 10*3/MM3 (ref 0–0.9)
MONOCYTES NFR BLD AUTO: 11.7 % (ref 0–12)
NEUTROPHILS # BLD AUTO: 4.39 10*3/MM3 (ref 2–8.6)
NEUTROPHILS NFR BLD AUTO: 54.2 % (ref 37–80)
NT-PROBNP SERPL-MCNC: 47.2 PG/ML (ref 0–900)
PLATELET # BLD AUTO: 224 10*3/MM3 (ref 150–450)
PMV BLD AUTO: 9.8 FL (ref 8–12)
POTASSIUM BLD-SCNC: 3.8 MMOL/L (ref 3.5–5.1)
PROT SERPL-MCNC: 7.7 G/DL (ref 6.3–8.6)
PROTHROMBIN TIME: 12.8 SECONDS (ref 11.1–15.3)
RBC # BLD AUTO: 5.33 10*6/MM3 (ref 4.37–5.74)
SODIUM BLD-SCNC: 139 MMOL/L (ref 137–145)
TROPONIN I SERPL-MCNC: <0.012 NG/ML
TROPONIN I SERPL-MCNC: <0.012 NG/ML
TSH SERPL DL<=0.05 MIU/L-ACNC: 0.68 MIU/ML (ref 0.46–4.68)
VIT B12 BLD-MCNC: 808 PG/ML (ref 239–931)
WBC NRBC COR # BLD: 8.09 10*3/MM3 (ref 3.2–9.8)
WHOLE BLOOD HOLD SPECIMEN: NORMAL

## 2018-02-24 PROCEDURE — 84443 ASSAY THYROID STIM HORMONE: CPT | Performed by: FAMILY MEDICINE

## 2018-02-24 PROCEDURE — 93005 ELECTROCARDIOGRAM TRACING: CPT | Performed by: FAMILY MEDICINE

## 2018-02-24 PROCEDURE — 82550 ASSAY OF CK (CPK): CPT | Performed by: FAMILY MEDICINE

## 2018-02-24 PROCEDURE — 83036 HEMOGLOBIN GLYCOSYLATED A1C: CPT | Performed by: FAMILY MEDICINE

## 2018-02-24 PROCEDURE — 83880 ASSAY OF NATRIURETIC PEPTIDE: CPT | Performed by: FAMILY MEDICINE

## 2018-02-24 PROCEDURE — G0378 HOSPITAL OBSERVATION PER HR: HCPCS

## 2018-02-24 PROCEDURE — 84484 ASSAY OF TROPONIN QUANT: CPT | Performed by: FAMILY MEDICINE

## 2018-02-24 PROCEDURE — 83735 ASSAY OF MAGNESIUM: CPT | Performed by: FAMILY MEDICINE

## 2018-02-24 PROCEDURE — 80053 COMPREHEN METABOLIC PANEL: CPT | Performed by: FAMILY MEDICINE

## 2018-02-24 PROCEDURE — 99285 EMERGENCY DEPT VISIT HI MDM: CPT

## 2018-02-24 PROCEDURE — 82553 CREATINE MB FRACTION: CPT | Performed by: FAMILY MEDICINE

## 2018-02-24 PROCEDURE — 63710000001 INSULIN ASPART PER 5 UNITS: Performed by: FAMILY MEDICINE

## 2018-02-24 PROCEDURE — 82962 GLUCOSE BLOOD TEST: CPT

## 2018-02-24 PROCEDURE — 82150 ASSAY OF AMYLASE: CPT | Performed by: FAMILY MEDICINE

## 2018-02-24 PROCEDURE — 71046 X-RAY EXAM CHEST 2 VIEWS: CPT

## 2018-02-24 PROCEDURE — 82607 VITAMIN B-12: CPT | Performed by: PHYSICIAN ASSISTANT

## 2018-02-24 PROCEDURE — 36415 COLL VENOUS BLD VENIPUNCTURE: CPT

## 2018-02-24 PROCEDURE — 82746 ASSAY OF FOLIC ACID SERUM: CPT | Performed by: PHYSICIAN ASSISTANT

## 2018-02-24 PROCEDURE — 93010 ELECTROCARDIOGRAM REPORT: CPT | Performed by: INTERNAL MEDICINE

## 2018-02-24 PROCEDURE — 85610 PROTHROMBIN TIME: CPT | Performed by: FAMILY MEDICINE

## 2018-02-24 PROCEDURE — 85025 COMPLETE CBC W/AUTO DIFF WBC: CPT | Performed by: FAMILY MEDICINE

## 2018-02-24 RX ORDER — ASPIRIN 81 MG/1
324 TABLET, CHEWABLE ORAL ONCE
Status: COMPLETED | OUTPATIENT
Start: 2018-02-24 | End: 2018-02-24

## 2018-02-24 RX ORDER — MORPHINE SULFATE 2 MG/ML
2 INJECTION, SOLUTION INTRAMUSCULAR; INTRAVENOUS EVERY 4 HOURS PRN
Status: DISCONTINUED | OUTPATIENT
Start: 2018-02-24 | End: 2018-02-25 | Stop reason: HOSPADM

## 2018-02-24 RX ORDER — VALSARTAN 80 MG/1
80 TABLET ORAL
Status: DISCONTINUED | OUTPATIENT
Start: 2018-02-25 | End: 2018-02-25 | Stop reason: HOSPADM

## 2018-02-24 RX ORDER — HYDRALAZINE HYDROCHLORIDE 20 MG/ML
10 INJECTION INTRAMUSCULAR; INTRAVENOUS EVERY 6 HOURS PRN
Status: DISCONTINUED | OUTPATIENT
Start: 2018-02-24 | End: 2018-02-25 | Stop reason: HOSPADM

## 2018-02-24 RX ORDER — NITROGLYCERIN 0.4 MG/1
0.4 TABLET SUBLINGUAL
Status: COMPLETED | OUTPATIENT
Start: 2018-02-24 | End: 2018-02-24

## 2018-02-24 RX ORDER — ATORVASTATIN CALCIUM 20 MG/1
20 TABLET, FILM COATED ORAL DAILY
Status: DISCONTINUED | OUTPATIENT
Start: 2018-02-25 | End: 2018-02-25 | Stop reason: HOSPADM

## 2018-02-24 RX ORDER — SODIUM CHLORIDE 0.9 % (FLUSH) 0.9 %
1-10 SYRINGE (ML) INJECTION AS NEEDED
Status: DISCONTINUED | OUTPATIENT
Start: 2018-02-24 | End: 2018-02-25 | Stop reason: HOSPADM

## 2018-02-24 RX ORDER — ONDANSETRON 2 MG/ML
4 INJECTION INTRAMUSCULAR; INTRAVENOUS EVERY 6 HOURS PRN
Status: DISCONTINUED | OUTPATIENT
Start: 2018-02-24 | End: 2018-02-25 | Stop reason: HOSPADM

## 2018-02-24 RX ORDER — FLUTICASONE PROPIONATE 50 MCG
2 SPRAY, SUSPENSION (ML) NASAL DAILY
Status: DISCONTINUED | OUTPATIENT
Start: 2018-02-24 | End: 2018-02-25 | Stop reason: HOSPADM

## 2018-02-24 RX ORDER — ACETAMINOPHEN 325 MG/1
650 TABLET ORAL EVERY 6 HOURS PRN
Status: DISCONTINUED | OUTPATIENT
Start: 2018-02-24 | End: 2018-02-25 | Stop reason: HOSPADM

## 2018-02-24 RX ORDER — SODIUM CHLORIDE 0.9 % (FLUSH) 0.9 %
10 SYRINGE (ML) INJECTION AS NEEDED
Status: DISCONTINUED | OUTPATIENT
Start: 2018-02-24 | End: 2018-02-25 | Stop reason: HOSPADM

## 2018-02-24 RX ORDER — ALBUTEROL SULFATE 2.5 MG/3ML
2.5 SOLUTION RESPIRATORY (INHALATION) EVERY 6 HOURS PRN
Status: DISCONTINUED | OUTPATIENT
Start: 2018-02-24 | End: 2018-02-25 | Stop reason: HOSPADM

## 2018-02-24 RX ORDER — NICOTINE POLACRILEX 4 MG
15 LOZENGE BUCCAL
Status: DISCONTINUED | OUTPATIENT
Start: 2018-02-24 | End: 2018-02-25 | Stop reason: HOSPADM

## 2018-02-24 RX ORDER — ASPIRIN 81 MG/1
81 TABLET ORAL DAILY
Status: DISCONTINUED | OUTPATIENT
Start: 2018-02-25 | End: 2018-02-25 | Stop reason: HOSPADM

## 2018-02-24 RX ORDER — ACETAMINOPHEN 325 MG/1
650 TABLET ORAL EVERY 4 HOURS PRN
Status: DISCONTINUED | OUTPATIENT
Start: 2018-02-24 | End: 2018-02-24 | Stop reason: DRUGHIGH

## 2018-02-24 RX ORDER — GLIPIZIDE 10 MG/1
10 TABLET ORAL
Status: DISCONTINUED | OUTPATIENT
Start: 2018-02-25 | End: 2018-02-25 | Stop reason: HOSPADM

## 2018-02-24 RX ORDER — DEXTROSE MONOHYDRATE 25 G/50ML
25 INJECTION, SOLUTION INTRAVENOUS
Status: DISCONTINUED | OUTPATIENT
Start: 2018-02-24 | End: 2018-02-25 | Stop reason: HOSPADM

## 2018-02-24 RX ADMIN — NITROGLYCERIN 0.4 MG: 0.4 TABLET SUBLINGUAL at 14:50

## 2018-02-24 RX ADMIN — ASPIRIN 81 MG 324 MG: 81 TABLET ORAL at 14:22

## 2018-02-24 RX ADMIN — NITROGLYCERIN 1 INCH: 20 OINTMENT TOPICAL at 16:52

## 2018-02-24 RX ADMIN — NITROGLYCERIN 0.4 MG: 0.4 TABLET SUBLINGUAL at 15:21

## 2018-02-24 RX ADMIN — ACETAMINOPHEN 650 MG: 325 TABLET ORAL at 18:10

## 2018-02-24 RX ADMIN — INSULIN ASPART 5 UNITS: 100 INJECTION, SOLUTION INTRAVENOUS; SUBCUTANEOUS at 20:10

## 2018-02-24 RX ADMIN — NITROGLYCERIN 0.4 MG: 0.4 TABLET SUBLINGUAL at 14:24

## 2018-02-25 ENCOUNTER — APPOINTMENT (OUTPATIENT)
Dept: CARDIOLOGY | Facility: HOSPITAL | Age: 53
End: 2018-02-25
Attending: FAMILY MEDICINE

## 2018-02-25 ENCOUNTER — APPOINTMENT (OUTPATIENT)
Dept: CT IMAGING | Facility: HOSPITAL | Age: 53
End: 2018-02-25

## 2018-02-25 VITALS
HEART RATE: 65 BPM | SYSTOLIC BLOOD PRESSURE: 142 MMHG | RESPIRATION RATE: 16 BRPM | DIASTOLIC BLOOD PRESSURE: 80 MMHG | BODY MASS INDEX: 34.07 KG/M2 | HEIGHT: 66 IN | WEIGHT: 212 LBS | OXYGEN SATURATION: 95 % | TEMPERATURE: 97.9 F

## 2018-02-25 LAB
ALBUMIN SERPL-MCNC: 3.8 G/DL (ref 3.4–4.8)
ALBUMIN/GLOB SERPL: 1.3 G/DL (ref 1.1–1.8)
ALP SERPL-CCNC: 65 U/L (ref 38–126)
ALT SERPL W P-5'-P-CCNC: 66 U/L (ref 21–72)
ANION GAP SERPL CALCULATED.3IONS-SCNC: 10 MMOL/L (ref 5–15)
ARTICHOKE IGE QN: 71 MG/DL (ref 1–129)
AST SERPL-CCNC: 28 U/L (ref 17–59)
BASOPHILS # BLD AUTO: 0.04 10*3/MM3 (ref 0–0.2)
BASOPHILS NFR BLD AUTO: 0.3 % (ref 0–2)
BH CV ECHO MEAS - ACS: 2.1 CM
BH CV ECHO MEAS - AO ISTHMUS: 2.7 CM
BH CV ECHO MEAS - AO MAX PG (FULL): 0.38 MMHG
BH CV ECHO MEAS - AO MAX PG: 10.2 MMHG
BH CV ECHO MEAS - AO MEAN PG (FULL): 0 MMHG
BH CV ECHO MEAS - AO MEAN PG: 5 MMHG
BH CV ECHO MEAS - AO ROOT AREA (BSA CORRECTED): 1.6
BH CV ECHO MEAS - AO ROOT AREA: 8.6 CM^2
BH CV ECHO MEAS - AO ROOT DIAM: 3.3 CM
BH CV ECHO MEAS - AO V2 MAX: 160 CM/SEC
BH CV ECHO MEAS - AO V2 MEAN: 101 CM/SEC
BH CV ECHO MEAS - AO V2 VTI: 30.7 CM
BH CV ECHO MEAS - ASC AORTA: 2.8 CM
BH CV ECHO MEAS - AVA(I,A): 2.9 CM^2
BH CV ECHO MEAS - AVA(I,D): 2.9 CM^2
BH CV ECHO MEAS - AVA(V,A): 3.1 CM^2
BH CV ECHO MEAS - AVA(V,D): 3.1 CM^2
BH CV ECHO MEAS - BSA(HAYCOCK): 2.2 M^2
BH CV ECHO MEAS - BSA: 2.1 M^2
BH CV ECHO MEAS - BZI_BMI: 34.2 KILOGRAMS/M^2
BH CV ECHO MEAS - BZI_METRIC_HEIGHT: 167.6 CM
BH CV ECHO MEAS - BZI_METRIC_WEIGHT: 96.2 KG
BH CV ECHO MEAS - EDV(CUBED): 112.7 ML
BH CV ECHO MEAS - EDV(TEICH): 109.1 ML
BH CV ECHO MEAS - EF(CUBED): 71.5 %
BH CV ECHO MEAS - EF(TEICH): 63 %
BH CV ECHO MEAS - ESV(CUBED): 32.2 ML
BH CV ECHO MEAS - ESV(TEICH): 40.3 ML
BH CV ECHO MEAS - FS: 34.2 %
BH CV ECHO MEAS - IVS/LVPW: 0.92
BH CV ECHO MEAS - IVSD: 0.91 CM
BH CV ECHO MEAS - LA DIMENSION: 3.1 CM
BH CV ECHO MEAS - LA/AO: 0.94
BH CV ECHO MEAS - LV MASS(C)D: 160.8 GRAMS
BH CV ECHO MEAS - LV MASS(C)DI: 78.4 GRAMS/M^2
BH CV ECHO MEAS - LV MAX PG: 9.9 MMHG
BH CV ECHO MEAS - LV MEAN PG: 5 MMHG
BH CV ECHO MEAS - LV V1 MAX: 157 CM/SEC
BH CV ECHO MEAS - LV V1 MEAN: 93.3 CM/SEC
BH CV ECHO MEAS - LV V1 VTI: 28.6 CM
BH CV ECHO MEAS - LVIDD: 4.8 CM
BH CV ECHO MEAS - LVIDS: 3.2 CM
BH CV ECHO MEAS - LVOT AREA (M): 3.1 CM^2
BH CV ECHO MEAS - LVOT AREA: 3.1 CM^2
BH CV ECHO MEAS - LVOT DIAM: 2 CM
BH CV ECHO MEAS - LVPWD: 0.99 CM
BH CV ECHO MEAS - MV A MAX VEL: 73.7 CM/SEC
BH CV ECHO MEAS - MV DEC SLOPE: 637 CM/SEC^2
BH CV ECHO MEAS - MV E MAX VEL: 99 CM/SEC
BH CV ECHO MEAS - MV E/A: 1.3
BH CV ECHO MEAS - MV MAX PG: 5.7 MMHG
BH CV ECHO MEAS - MV MEAN PG: 3 MMHG
BH CV ECHO MEAS - MV P1/2T MAX VEL: 121 CM/SEC
BH CV ECHO MEAS - MV P1/2T: 55.6 MSEC
BH CV ECHO MEAS - MV V2 MAX: 119 CM/SEC
BH CV ECHO MEAS - MV V2 MEAN: 77.6 CM/SEC
BH CV ECHO MEAS - MV V2 VTI: 31.7 CM
BH CV ECHO MEAS - MVA P1/2T LCG: 1.8 CM^2
BH CV ECHO MEAS - MVA(P1/2T): 4 CM^2
BH CV ECHO MEAS - MVA(VTI): 2.8 CM^2
BH CV ECHO MEAS - PA MAX PG: 3.2 MMHG
BH CV ECHO MEAS - PA V2 MAX: 89.3 CM/SEC
BH CV ECHO MEAS - RAP SYSTOLE: 5 MMHG
BH CV ECHO MEAS - RVDD: 1.7 CM
BH CV ECHO MEAS - RVSP: 14.7 MMHG
BH CV ECHO MEAS - SI(AO): 128.1 ML/M^2
BH CV ECHO MEAS - SI(CUBED): 39.3 ML/M^2
BH CV ECHO MEAS - SI(LVOT): 43.8 ML/M^2
BH CV ECHO MEAS - SI(TEICH): 33.5 ML/M^2
BH CV ECHO MEAS - SV(AO): 262.6 ML
BH CV ECHO MEAS - SV(CUBED): 80.5 ML
BH CV ECHO MEAS - SV(LVOT): 89.8 ML
BH CV ECHO MEAS - SV(TEICH): 68.8 ML
BH CV ECHO MEAS - TR MAX VEL: 156 CM/SEC
BILIRUB SERPL-MCNC: 1.9 MG/DL (ref 0.2–1.3)
BUN BLD-MCNC: 18 MG/DL (ref 7–21)
BUN/CREAT SERPL: 18.8 (ref 7–25)
CALCIUM SPEC-SCNC: 8.3 MG/DL (ref 8.4–10.2)
CHLORIDE SERPL-SCNC: 99 MMOL/L (ref 95–110)
CHOLEST SERPL-MCNC: 127 MG/DL (ref 0–199)
CO2 SERPL-SCNC: 26 MMOL/L (ref 22–31)
CREAT BLD-MCNC: 0.96 MG/DL (ref 0.7–1.3)
D-DIMER, QUANTITATIVE (MAD,POW, STR): <270 NG/ML (FEU) (ref 0–470)
DEPRECATED RDW RBC AUTO: 36.7 FL (ref 35.1–43.9)
EOSINOPHIL # BLD AUTO: 0.3 10*3/MM3 (ref 0–0.7)
EOSINOPHIL NFR BLD AUTO: 2.6 % (ref 0–7)
ERYTHROCYTE [DISTWIDTH] IN BLOOD BY AUTOMATED COUNT: 12 % (ref 11.5–14.5)
GFR SERPL CREATININE-BSD FRML MDRD: 83 ML/MIN/1.73 (ref 60–130)
GLOBULIN UR ELPH-MCNC: 2.9 GM/DL (ref 2.3–3.5)
GLUCOSE BLD-MCNC: 274 MG/DL (ref 60–100)
GLUCOSE BLDC GLUCOMTR-MCNC: 178 MG/DL (ref 70–130)
GLUCOSE BLDC GLUCOMTR-MCNC: 225 MG/DL (ref 70–130)
HBA1C MFR BLD: 7.8 % (ref 4–5.6)
HCT VFR BLD AUTO: 41.6 % (ref 39–49)
HDLC SERPL-MCNC: 32 MG/DL (ref 60–200)
HGB BLD-MCNC: 14.8 G/DL (ref 13.7–17.3)
IMM GRANULOCYTES # BLD: 0.05 10*3/MM3 (ref 0–0.02)
IMM GRANULOCYTES NFR BLD: 0.4 % (ref 0–0.5)
LDLC/HDLC SERPL: 2.12 {RATIO} (ref 0–3.55)
LYMPHOCYTES # BLD AUTO: 2.12 10*3/MM3 (ref 0.6–4.2)
LYMPHOCYTES NFR BLD AUTO: 18.4 % (ref 10–50)
MAGNESIUM SERPL-MCNC: 1.7 MG/DL (ref 1.6–2.3)
MCH RBC QN AUTO: 30.2 PG (ref 26.5–34)
MCHC RBC AUTO-ENTMCNC: 35.6 G/DL (ref 31.5–36.3)
MCV RBC AUTO: 84.9 FL (ref 80–98)
MONOCYTES # BLD AUTO: 1.26 10*3/MM3 (ref 0–0.9)
MONOCYTES NFR BLD AUTO: 11 % (ref 0–12)
NEUTROPHILS # BLD AUTO: 7.73 10*3/MM3 (ref 2–8.6)
NEUTROPHILS NFR BLD AUTO: 67.3 % (ref 37–80)
PHOSPHATE SERPL-MCNC: 3.3 MG/DL (ref 2.4–4.4)
PLATELET # BLD AUTO: 207 10*3/MM3 (ref 150–450)
PMV BLD AUTO: 9.9 FL (ref 8–12)
POTASSIUM BLD-SCNC: 4.2 MMOL/L (ref 3.5–5.1)
PROT SERPL-MCNC: 6.7 G/DL (ref 6.3–8.6)
RBC # BLD AUTO: 4.9 10*6/MM3 (ref 4.37–5.74)
SODIUM BLD-SCNC: 135 MMOL/L (ref 137–145)
TRIGL SERPL-MCNC: 136 MG/DL (ref 20–199)
TROPONIN I SERPL-MCNC: <0.012 NG/ML
TROPONIN I SERPL-MCNC: <0.012 NG/ML
WBC NRBC COR # BLD: 11.5 10*3/MM3 (ref 3.2–9.8)

## 2018-02-25 PROCEDURE — 93306 TTE W/DOPPLER COMPLETE: CPT

## 2018-02-25 PROCEDURE — 84484 ASSAY OF TROPONIN QUANT: CPT | Performed by: FAMILY MEDICINE

## 2018-02-25 PROCEDURE — 85025 COMPLETE CBC W/AUTO DIFF WBC: CPT | Performed by: FAMILY MEDICINE

## 2018-02-25 PROCEDURE — 0 IOPAMIDOL PER 1 ML: Performed by: FAMILY MEDICINE

## 2018-02-25 PROCEDURE — 93005 ELECTROCARDIOGRAM TRACING: CPT | Performed by: FAMILY MEDICINE

## 2018-02-25 PROCEDURE — 93010 ELECTROCARDIOGRAM REPORT: CPT | Performed by: INTERNAL MEDICINE

## 2018-02-25 PROCEDURE — G0378 HOSPITAL OBSERVATION PER HR: HCPCS

## 2018-02-25 PROCEDURE — 99244 OFF/OP CNSLTJ NEW/EST MOD 40: CPT | Performed by: INTERNAL MEDICINE

## 2018-02-25 PROCEDURE — 75574 CT ANGIO HRT W/3D IMAGE: CPT

## 2018-02-25 PROCEDURE — 80061 LIPID PANEL: CPT | Performed by: FAMILY MEDICINE

## 2018-02-25 PROCEDURE — 85379 FIBRIN DEGRADATION QUANT: CPT | Performed by: PHYSICIAN ASSISTANT

## 2018-02-25 PROCEDURE — 63710000001 INSULIN ASPART PER 5 UNITS: Performed by: FAMILY MEDICINE

## 2018-02-25 PROCEDURE — 84100 ASSAY OF PHOSPHORUS: CPT | Performed by: FAMILY MEDICINE

## 2018-02-25 PROCEDURE — 80053 COMPREHEN METABOLIC PANEL: CPT | Performed by: FAMILY MEDICINE

## 2018-02-25 PROCEDURE — 93306 TTE W/DOPPLER COMPLETE: CPT | Performed by: INTERNAL MEDICINE

## 2018-02-25 PROCEDURE — 82962 GLUCOSE BLOOD TEST: CPT

## 2018-02-25 PROCEDURE — 83735 ASSAY OF MAGNESIUM: CPT | Performed by: FAMILY MEDICINE

## 2018-02-25 RX ADMIN — ASPIRIN 81 MG: 81 TABLET, COATED ORAL at 08:31

## 2018-02-25 RX ADMIN — ACETAMINOPHEN 650 MG: 325 TABLET ORAL at 04:11

## 2018-02-25 RX ADMIN — FLUTICASONE PROPIONATE 2 SPRAY: 50 SPRAY, METERED NASAL at 11:01

## 2018-02-25 RX ADMIN — VALSARTAN 80 MG: 80 TABLET, FILM COATED ORAL at 08:31

## 2018-02-25 RX ADMIN — IOPAMIDOL 90 ML: 755 INJECTION, SOLUTION INTRAVENOUS at 09:47

## 2018-02-25 RX ADMIN — INSULIN ASPART 3 UNITS: 100 INJECTION, SOLUTION INTRAVENOUS; SUBCUTANEOUS at 10:59

## 2018-02-25 RX ADMIN — ATORVASTATIN CALCIUM 20 MG: 20 TABLET, FILM COATED ORAL at 10:59

## 2018-02-27 LAB
ALBUMIN SERPL-MCNC: 4.2 G/DL (ref 3.2–5.5)
ALBUMIN/GLOB SERPL: 1.1 G/DL (ref 1–3)
ALP SERPL-CCNC: 73 U/L (ref 15–121)
ALT SERPL W P-5'-P-CCNC: 57 U/L (ref 10–60)
ANION GAP SERPL CALCULATED.3IONS-SCNC: 10 MMOL/L (ref 5–15)
AST SERPL-CCNC: 36 U/L (ref 10–60)
BASOPHILS # BLD AUTO: 0.06 10*3/MM3 (ref 0–0.2)
BASOPHILS NFR BLD AUTO: 0.6 % (ref 0–2)
BILIRUB SERPL-MCNC: 2.2 MG/DL (ref 0.2–1)
BILIRUB UR QL STRIP: NEGATIVE
BUN BLD-MCNC: 17 MG/DL (ref 8–25)
BUN/CREAT SERPL: 17 (ref 7–25)
CALCIUM SPEC-SCNC: 9.2 MG/DL (ref 8.4–10.8)
CHLORIDE SERPL-SCNC: 98 MMOL/L (ref 100–112)
CLARITY UR: CLEAR
CO2 SERPL-SCNC: 28 MMOL/L (ref 20–32)
COLOR UR: YELLOW
CREAT BLD-MCNC: 1 MG/DL (ref 0.4–1.3)
DEPRECATED RDW RBC AUTO: 37.9 FL (ref 35.1–43.9)
EOSINOPHIL # BLD AUTO: 0.34 10*3/MM3 (ref 0–0.7)
EOSINOPHIL NFR BLD AUTO: 3.5 % (ref 0–7)
ERYTHROCYTE [DISTWIDTH] IN BLOOD BY AUTOMATED COUNT: 12.4 % (ref 11.5–14.5)
GFR SERPL CREATININE-BSD FRML MDRD: 79 ML/MIN/1.73 (ref 56–130)
GLOBULIN UR ELPH-MCNC: 3.8 GM/DL (ref 2.5–4.6)
GLUCOSE BLD-MCNC: 169 MG/DL (ref 70–100)
GLUCOSE UR STRIP-MCNC: NEGATIVE MG/DL
HCT VFR BLD AUTO: 47.8 % (ref 39–49)
HGB BLD-MCNC: 17.1 G/DL (ref 13.7–17.3)
HGB UR QL STRIP.AUTO: NEGATIVE
KETONES UR QL STRIP: NEGATIVE
LEUKOCYTE ESTERASE UR QL STRIP.AUTO: NEGATIVE
LYMPHOCYTES # BLD AUTO: 2.3 10*3/MM3 (ref 0.6–4.2)
LYMPHOCYTES NFR BLD AUTO: 23.8 % (ref 10–50)
MCH RBC QN AUTO: 30.4 PG (ref 26.5–34)
MCHC RBC AUTO-ENTMCNC: 35.8 G/DL (ref 31.5–36.3)
MCV RBC AUTO: 85.1 FL (ref 80–98)
MONOCYTES # BLD AUTO: 1.07 10*3/MM3 (ref 0–0.9)
MONOCYTES NFR BLD AUTO: 11.1 % (ref 0–12)
NEUTROPHILS # BLD AUTO: 5.89 10*3/MM3 (ref 2–8.6)
NEUTROPHILS NFR BLD AUTO: 61 % (ref 37–80)
NITRITE UR QL STRIP: NEGATIVE
PH UR STRIP.AUTO: 6.5 [PH] (ref 5.5–8)
PLATELET # BLD AUTO: 271 10*3/MM3 (ref 150–450)
PMV BLD AUTO: 9.8 FL (ref 8–12)
POTASSIUM BLD-SCNC: 4.2 MMOL/L (ref 3.4–5.4)
PROT SERPL-MCNC: 8 G/DL (ref 6.7–8.2)
PROT UR QL STRIP: ABNORMAL
RBC # BLD AUTO: 5.62 10*6/MM3 (ref 4.37–5.74)
SODIUM BLD-SCNC: 136 MMOL/L (ref 134–146)
SP GR UR STRIP: 1.02 (ref 1–1.03)
UROBILINOGEN UR QL STRIP: ABNORMAL
WBC NRBC COR # BLD: 9.66 10*3/MM3 (ref 3.2–9.8)

## 2018-02-27 PROCEDURE — 36415 COLL VENOUS BLD VENIPUNCTURE: CPT | Performed by: NURSE PRACTITIONER

## 2018-02-27 PROCEDURE — 80053 COMPREHEN METABOLIC PANEL: CPT | Performed by: NURSE PRACTITIONER

## 2018-02-27 PROCEDURE — 81003 URINALYSIS AUTO W/O SCOPE: CPT | Performed by: NURSE PRACTITIONER

## 2018-02-27 PROCEDURE — 85025 COMPLETE CBC W/AUTO DIFF WBC: CPT | Performed by: NURSE PRACTITIONER

## 2018-02-28 ENCOUNTER — TELEPHONE (OUTPATIENT)
Dept: FAMILY MEDICINE CLINIC | Facility: CLINIC | Age: 53
End: 2018-02-28

## 2018-02-28 NOTE — TELEPHONE ENCOUNTER
Left patient voicemail of results, and told him if he has any questions he can give our office a call.

## 2018-02-28 NOTE — TELEPHONE ENCOUNTER
----- Message from BRYANT Booth sent at 2/27/2018 11:09 AM CST -----  Inform pt, improved glucose but still elevated, follow diabetic diet and exercise

## 2018-03-05 RX ORDER — GLIMEPIRIDE 4 MG/1
4 TABLET ORAL
Qty: 30 TABLET | Refills: 3 | Status: SHIPPED | OUTPATIENT
Start: 2018-03-05 | End: 2018-04-10 | Stop reason: SDUPTHER

## 2018-03-05 RX ORDER — GLIMEPIRIDE 4 MG/1
TABLET ORAL
Qty: 30 TABLET | Refills: 0 | Status: SHIPPED | OUTPATIENT
Start: 2018-03-05 | End: 2018-03-05 | Stop reason: SDUPTHER

## 2018-03-14 ENCOUNTER — OFFICE VISIT (OUTPATIENT)
Dept: PULMONOLOGY | Facility: CLINIC | Age: 53
End: 2018-03-14

## 2018-03-14 VITALS
BODY MASS INDEX: 34.07 KG/M2 | HEIGHT: 66 IN | WEIGHT: 212 LBS | HEART RATE: 74 BPM | OXYGEN SATURATION: 96 % | DIASTOLIC BLOOD PRESSURE: 84 MMHG | SYSTOLIC BLOOD PRESSURE: 140 MMHG

## 2018-03-14 DIAGNOSIS — J30.2 CHRONIC SEASONAL ALLERGIC RHINITIS, UNSPECIFIED TRIGGER: ICD-10-CM

## 2018-03-14 DIAGNOSIS — E66.09 CLASS 1 OBESITY DUE TO EXCESS CALORIES WITH BODY MASS INDEX (BMI) OF 34.0 TO 34.9 IN ADULT, UNSPECIFIED WHETHER SERIOUS COMORBIDITY PRESENT: ICD-10-CM

## 2018-03-14 DIAGNOSIS — J45.20 MILD INTERMITTENT REACTIVE AIRWAY DISEASE WITHOUT COMPLICATION: ICD-10-CM

## 2018-03-14 DIAGNOSIS — R07.2 PRECORDIAL PAIN: Primary | ICD-10-CM

## 2018-03-14 DIAGNOSIS — R06.83 SNORING: ICD-10-CM

## 2018-03-14 PROBLEM — J45.909 REACTIVE AIRWAY DISEASE: Status: ACTIVE | Noted: 2018-03-14

## 2018-03-14 PROCEDURE — 99204 OFFICE O/P NEW MOD 45 MIN: CPT | Performed by: INTERNAL MEDICINE

## 2018-03-14 PROCEDURE — 94060 EVALUATION OF WHEEZING: CPT | Performed by: INTERNAL MEDICINE

## 2018-03-14 PROCEDURE — G8417 CALC BMI ABV UP PARAM F/U: HCPCS | Performed by: INTERNAL MEDICINE

## 2018-03-14 PROCEDURE — G9903 PT SCRN TBCO ID AS NON USER: HCPCS | Performed by: INTERNAL MEDICINE

## 2018-03-14 RX ORDER — ALBUTEROL SULFATE 90 UG/1
2 AEROSOL, METERED RESPIRATORY (INHALATION) EVERY 4 HOURS PRN
Qty: 1 INHALER | Refills: 11 | Status: SHIPPED | OUTPATIENT
Start: 2018-03-14 | End: 2020-11-09
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
## 2018-04-09 ENCOUNTER — OFFICE VISIT (OUTPATIENT)
Dept: FAMILY MEDICINE CLINIC | Facility: CLINIC | Age: 53
End: 2018-04-09

## 2018-04-09 VITALS
TEMPERATURE: 97.8 F | DIASTOLIC BLOOD PRESSURE: 78 MMHG | BODY MASS INDEX: 33.97 KG/M2 | WEIGHT: 211.4 LBS | SYSTOLIC BLOOD PRESSURE: 132 MMHG | HEART RATE: 67 BPM | OXYGEN SATURATION: 98 % | HEIGHT: 66 IN

## 2018-04-09 DIAGNOSIS — E11.65 TYPE 2 DIABETES MELLITUS WITH HYPERGLYCEMIA, WITHOUT LONG-TERM CURRENT USE OF INSULIN (HCC): Chronic | ICD-10-CM

## 2018-04-09 DIAGNOSIS — I10 ESSENTIAL HYPERTENSION: Chronic | ICD-10-CM

## 2018-04-09 DIAGNOSIS — E78.5 HYPERLIPIDEMIA, UNSPECIFIED HYPERLIPIDEMIA TYPE: Primary | Chronic | ICD-10-CM

## 2018-04-09 PROCEDURE — 99214 OFFICE O/P EST MOD 30 MIN: CPT | Performed by: NURSE PRACTITIONER

## 2018-04-09 RX ORDER — GLIMEPIRIDE 4 MG/1
4 TABLET ORAL
Qty: 30 TABLET | Refills: 5 | Status: CANCELLED | OUTPATIENT
Start: 2018-04-09

## 2018-04-09 RX ORDER — ATORVASTATIN CALCIUM 20 MG/1
20 TABLET, FILM COATED ORAL DAILY
Qty: 30 TABLET | Refills: 5 | Status: CANCELLED | OUTPATIENT
Start: 2018-04-09

## 2018-04-09 RX ORDER — VALSARTAN 80 MG/1
80 TABLET ORAL DAILY
Qty: 30 TABLET | Refills: 5 | Status: CANCELLED | OUTPATIENT
Start: 2018-04-09

## 2018-04-10 RX ORDER — ATORVASTATIN CALCIUM 20 MG/1
20 TABLET, FILM COATED ORAL DAILY
Qty: 30 TABLET | Refills: 5 | Status: SHIPPED | OUTPATIENT
Start: 2018-04-10 | End: 2019-02-15 | Stop reason: SDUPTHER

## 2018-04-10 RX ORDER — GLIMEPIRIDE 4 MG/1
4 TABLET ORAL
Qty: 30 TABLET | Refills: 3 | Status: SHIPPED | OUTPATIENT
Start: 2018-04-10 | End: 2018-09-18 | Stop reason: SDUPTHER

## 2018-04-10 RX ORDER — VALSARTAN 80 MG/1
80 TABLET ORAL DAILY
Qty: 30 TABLET | Refills: 5 | Status: SHIPPED | OUTPATIENT
Start: 2018-04-10 | End: 2019-02-04 | Stop reason: ALTCHOICE

## 2018-07-30 ENCOUNTER — TELEPHONE (OUTPATIENT)
Dept: FAMILY MEDICINE CLINIC | Facility: CLINIC | Age: 53
End: 2018-07-30

## 2018-07-30 RX ORDER — LOSARTAN POTASSIUM 50 MG/1
50 TABLET ORAL DAILY
Qty: 30 TABLET | Refills: 5 | Status: SHIPPED | OUTPATIENT
Start: 2018-07-30 | End: 2019-03-24 | Stop reason: SDUPTHER

## 2018-09-18 RX ORDER — GLIMEPIRIDE 4 MG/1
TABLET ORAL
Qty: 30 TABLET | Refills: 0 | Status: SHIPPED | OUTPATIENT
Start: 2018-09-18 | End: 2018-10-25 | Stop reason: SDUPTHER

## 2018-10-25 RX ORDER — GLIMEPIRIDE 4 MG/1
TABLET ORAL
Qty: 30 TABLET | Refills: 0 | Status: SHIPPED | OUTPATIENT
Start: 2018-10-25 | End: 2018-11-30 | Stop reason: SDUPTHER

## 2018-12-03 RX ORDER — GLIMEPIRIDE 4 MG/1
TABLET ORAL
Qty: 30 TABLET | Refills: 0 | Status: SHIPPED | OUTPATIENT
Start: 2018-12-03 | End: 2018-12-04 | Stop reason: SDUPTHER

## 2018-12-04 DIAGNOSIS — E11.9 TYPE 2 DIABETES MELLITUS WITHOUT COMPLICATION, WITHOUT LONG-TERM CURRENT USE OF INSULIN (HCC): Primary | ICD-10-CM

## 2018-12-04 RX ORDER — GLIMEPIRIDE 4 MG/1
4 TABLET ORAL
Qty: 30 TABLET | Refills: 0 | Status: SHIPPED | OUTPATIENT
Start: 2018-12-04 | End: 2019-03-01 | Stop reason: SDUPTHER

## 2019-01-30 ENCOUNTER — TELEPHONE (OUTPATIENT)
Dept: FAMILY MEDICINE CLINIC | Facility: CLINIC | Age: 54
End: 2019-01-30

## 2019-02-01 ENCOUNTER — TRANSCRIBE ORDERS (OUTPATIENT)
Dept: LAB | Facility: OTHER | Age: 54
End: 2019-02-01

## 2019-02-01 ENCOUNTER — LAB (OUTPATIENT)
Dept: LAB | Facility: OTHER | Age: 54
End: 2019-02-01

## 2019-02-01 DIAGNOSIS — N13.8 BPH WITH OBSTRUCTION/LOWER URINARY TRACT SYMPTOMS: Primary | ICD-10-CM

## 2019-02-01 DIAGNOSIS — N13.8 BPH WITH OBSTRUCTION/LOWER URINARY TRACT SYMPTOMS: ICD-10-CM

## 2019-02-01 DIAGNOSIS — E11.9 TYPE 2 DIABETES MELLITUS WITHOUT COMPLICATION, WITHOUT LONG-TERM CURRENT USE OF INSULIN (HCC): ICD-10-CM

## 2019-02-01 DIAGNOSIS — N40.1 BPH WITH OBSTRUCTION/LOWER URINARY TRACT SYMPTOMS: ICD-10-CM

## 2019-02-01 DIAGNOSIS — N40.1 BPH WITH OBSTRUCTION/LOWER URINARY TRACT SYMPTOMS: Primary | ICD-10-CM

## 2019-02-01 LAB
ALBUMIN SERPL-MCNC: 4.3 G/DL (ref 3.5–5)
ALBUMIN/GLOB SERPL: 1.4 G/DL (ref 1.1–1.8)
ALP SERPL-CCNC: 91 U/L (ref 38–126)
ALT SERPL W P-5'-P-CCNC: 64 U/L
ANION GAP SERPL CALCULATED.3IONS-SCNC: 10 MMOL/L (ref 5–15)
AST SERPL-CCNC: 35 U/L (ref 17–59)
BASOPHILS # BLD AUTO: 0.05 10*3/MM3 (ref 0–0.2)
BASOPHILS NFR BLD AUTO: 0.5 % (ref 0–2)
BILIRUB SERPL-MCNC: 2 MG/DL (ref 0.2–1.3)
BILIRUB UR QL STRIP: NEGATIVE
BUN BLD-MCNC: 14 MG/DL (ref 9–20)
BUN/CREAT SERPL: 15.1 (ref 7–25)
CALCIUM SPEC-SCNC: 9 MG/DL (ref 8.4–10.2)
CHLORIDE SERPL-SCNC: 100 MMOL/L (ref 98–107)
CHOLEST SERPL-MCNC: 133 MG/DL (ref 150–200)
CLARITY UR: ABNORMAL
CO2 SERPL-SCNC: 29 MMOL/L (ref 22–30)
COLOR UR: ABNORMAL
CREAT BLD-MCNC: 0.93 MG/DL (ref 0.66–1.25)
DEPRECATED RDW RBC AUTO: 38.4 FL (ref 35.1–43.9)
EOSINOPHIL # BLD AUTO: 0.24 10*3/MM3 (ref 0–0.7)
EOSINOPHIL NFR BLD AUTO: 2.6 % (ref 0–7)
ERYTHROCYTE [DISTWIDTH] IN BLOOD BY AUTOMATED COUNT: 12.2 % (ref 11.5–14.5)
GFR SERPL CREATININE-BSD FRML MDRD: 86 ML/MIN/1.73 (ref 56–130)
GLOBULIN UR ELPH-MCNC: 3.1 GM/DL (ref 2.3–3.5)
GLUCOSE BLD-MCNC: 239 MG/DL (ref 74–99)
GLUCOSE UR STRIP-MCNC: ABNORMAL MG/DL
HBA1C MFR BLD: 12.4 % (ref 4–5.6)
HCT VFR BLD AUTO: 46.1 % (ref 39–49)
HDLC SERPL-MCNC: 38 MG/DL (ref 40–59)
HGB BLD-MCNC: 15.9 G/DL (ref 13.7–17.3)
HGB UR QL STRIP.AUTO: NEGATIVE
KETONES UR QL STRIP: NEGATIVE
LDLC SERPL CALC-MCNC: 73 MG/DL
LDLC/HDLC SERPL: 1.91 {RATIO} (ref 0–3.55)
LEUKOCYTE ESTERASE UR QL STRIP.AUTO: NEGATIVE
LYMPHOCYTES # BLD AUTO: 3.33 10*3/MM3 (ref 0.6–4.2)
LYMPHOCYTES NFR BLD AUTO: 36.4 % (ref 10–50)
MCH RBC QN AUTO: 29.9 PG (ref 26.5–34)
MCHC RBC AUTO-ENTMCNC: 34.5 G/DL (ref 31.5–36.3)
MCV RBC AUTO: 86.7 FL (ref 80–98)
MONOCYTES # BLD AUTO: 1.07 10*3/MM3 (ref 0–0.9)
MONOCYTES NFR BLD AUTO: 11.7 % (ref 0–12)
NEUTROPHILS # BLD AUTO: 4.46 10*3/MM3 (ref 2–8.6)
NEUTROPHILS NFR BLD AUTO: 48.8 % (ref 37–80)
NITRITE UR QL STRIP: NEGATIVE
PH UR STRIP.AUTO: 6 [PH] (ref 5.5–8)
PLATELET # BLD AUTO: 265 10*3/MM3 (ref 150–450)
PMV BLD AUTO: 9.6 FL (ref 8–12)
POTASSIUM BLD-SCNC: 3.8 MMOL/L (ref 3.4–5)
PROT SERPL-MCNC: 7.4 G/DL (ref 6.3–8.2)
PROT UR QL STRIP: NEGATIVE
PSA SERPL-MCNC: 1.15 NG/ML (ref 0–4)
RBC # BLD AUTO: 5.32 10*6/MM3 (ref 4.37–5.74)
SODIUM BLD-SCNC: 139 MMOL/L (ref 137–145)
SP GR UR STRIP: >=1.03 (ref 1–1.03)
T4 SERPL-MCNC: 8.75 MCG/DL (ref 5.5–11)
TRIGL SERPL-MCNC: 112 MG/DL
TSH SERPL DL<=0.05 MIU/L-ACNC: 1.88 MIU/ML (ref 0.46–4.68)
UROBILINOGEN UR QL STRIP: ABNORMAL
VLDLC SERPL-MCNC: 22.4 MG/DL
WBC NRBC COR # BLD: 9.15 10*3/MM3 (ref 3.2–9.8)

## 2019-02-01 PROCEDURE — 81003 URINALYSIS AUTO W/O SCOPE: CPT | Performed by: NURSE PRACTITIONER

## 2019-02-01 PROCEDURE — 84481 FREE ASSAY (FT-3): CPT | Performed by: NURSE PRACTITIONER

## 2019-02-01 PROCEDURE — 84443 ASSAY THYROID STIM HORMONE: CPT | Performed by: NURSE PRACTITIONER

## 2019-02-01 PROCEDURE — 83036 HEMOGLOBIN GLYCOSYLATED A1C: CPT | Performed by: NURSE PRACTITIONER

## 2019-02-01 PROCEDURE — 84436 ASSAY OF TOTAL THYROXINE: CPT | Performed by: NURSE PRACTITIONER

## 2019-02-01 PROCEDURE — 36415 COLL VENOUS BLD VENIPUNCTURE: CPT | Performed by: NURSE PRACTITIONER

## 2019-02-01 PROCEDURE — 80061 LIPID PANEL: CPT | Performed by: NURSE PRACTITIONER

## 2019-02-01 PROCEDURE — 85025 COMPLETE CBC W/AUTO DIFF WBC: CPT | Performed by: NURSE PRACTITIONER

## 2019-02-01 PROCEDURE — 84153 ASSAY OF PSA TOTAL: CPT | Performed by: NURSE PRACTITIONER

## 2019-02-01 PROCEDURE — 80053 COMPREHEN METABOLIC PANEL: CPT | Performed by: NURSE PRACTITIONER

## 2019-02-03 LAB — T3FREE SERPL-MCNC: 3.3 PG/ML (ref 2–4.4)

## 2019-02-04 ENCOUNTER — OFFICE VISIT (OUTPATIENT)
Dept: FAMILY MEDICINE CLINIC | Facility: CLINIC | Age: 54
End: 2019-02-04

## 2019-02-04 VITALS
DIASTOLIC BLOOD PRESSURE: 88 MMHG | HEIGHT: 66 IN | HEART RATE: 69 BPM | WEIGHT: 201.2 LBS | BODY MASS INDEX: 32.33 KG/M2 | OXYGEN SATURATION: 98 % | SYSTOLIC BLOOD PRESSURE: 138 MMHG | TEMPERATURE: 97.1 F

## 2019-02-04 DIAGNOSIS — E78.5 HYPERLIPIDEMIA, UNSPECIFIED HYPERLIPIDEMIA TYPE: Chronic | ICD-10-CM

## 2019-02-04 DIAGNOSIS — I10 ESSENTIAL HYPERTENSION: Chronic | ICD-10-CM

## 2019-02-04 DIAGNOSIS — E11.65 TYPE 2 DIABETES MELLITUS WITH HYPERGLYCEMIA, WITHOUT LONG-TERM CURRENT USE OF INSULIN (HCC): Primary | Chronic | ICD-10-CM

## 2019-02-04 DIAGNOSIS — E66.9 CLASS 1 OBESITY WITH SERIOUS COMORBIDITY AND BODY MASS INDEX (BMI) OF 32.0 TO 32.9 IN ADULT, UNSPECIFIED OBESITY TYPE: Chronic | ICD-10-CM

## 2019-02-04 PROCEDURE — 99214 OFFICE O/P EST MOD 30 MIN: CPT | Performed by: NURSE PRACTITIONER

## 2019-02-04 RX ORDER — RANITIDINE 150 MG/1
150 TABLET ORAL 2 TIMES DAILY
Refills: 0 | COMMUNITY
Start: 2019-01-23 | End: 2020-08-24

## 2019-02-04 NOTE — PROGRESS NOTES
"Subjective   Sharan Joesph Gomez is a 53 y.o. male. Patient here today with complaints of Follow-up (lab work )  pt here today for recheck of diabetes, htn, hyperlipidemia. States fsbs running 236 2 days ago, yesterday am 163. Had labs recently and is here for those results. Needing refills on medicines today. Denies chest pain, shortness of breath, headache, neuropathic symptoms, vision changes . States \" I feel pretty good\". Denies fatigue.     Vitals:    02/04/19 0912   BP: 138/88   Pulse: 69   Temp: 97.1 °F (36.2 °C)   SpO2: 98%     Past Medical History:   Diagnosis Date   • Elevated blood pressure    • Encounter for general adult medical examination without abnormal findings    • Essential hypertension    • Hyperlipidemia    • Kidney stone    • Left flank pain    • Low back pain    • Muscle strain    • Type 2 diabetes mellitus (CMS/HCC)      Diabetes   He presents for his follow-up diabetic visit. He has type 2 diabetes mellitus. His disease course has been worsening. There are no hypoglycemic associated symptoms. Pertinent negatives for hypoglycemia include no headaches or sweats. There are no diabetic associated symptoms. Pertinent negatives for diabetes include no blurred vision and no chest pain. There are no hypoglycemic complications. Symptoms are stable. Risk factors for coronary artery disease include male sex, hypertension, obesity, dyslipidemia and diabetes mellitus. Current diabetic treatment includes diet and oral agent (monotherapy). He is compliant with treatment some of the time. His weight is decreasing steadily. When asked about meal planning, he reported none. His home blood glucose trend is increasing rapidly. His breakfast blood glucose range is generally >200 mg/dl. An ACE inhibitor/angiotensin II receptor blocker is being taken.   Hypertension   This is a chronic problem. The current episode started more than 1 year ago. The problem is unchanged. The problem is controlled. Pertinent " negatives include no anxiety, blurred vision, chest pain, headaches, malaise/fatigue, neck pain, orthopnea, palpitations, peripheral edema, PND, shortness of breath or sweats. There are no associated agents to hypertension. Risk factors for coronary artery disease include male gender, obesity, dyslipidemia and diabetes mellitus. Past treatments include lifestyle changes and angiotensin blockers. Current antihypertension treatment includes lifestyle changes and angiotensin blockers. The current treatment provides moderate improvement. Compliance problems include exercise and diet.  There is no history of chronic renal disease.   Hyperlipidemia   This is a chronic problem. The current episode started more than 1 year ago. Exacerbating diseases include diabetes and obesity. He has no history of chronic renal disease, hypothyroidism, liver disease or nephrotic syndrome. Factors aggravating his hyperlipidemia include fatty foods. Pertinent negatives include no chest pain, focal sensory loss, focal weakness, leg pain, myalgias or shortness of breath. Current antihyperlipidemic treatment includes statins, diet change and exercise. Compliance problems include adherence to exercise and adherence to diet.  Risk factors for coronary artery disease include male sex, obesity, hypertension, dyslipidemia and diabetes mellitus.        The following portions of the patient's history were reviewed and updated as appropriate: allergies, current medications, past family history, past medical history, past social history, past surgical history and problem list.    Review of Systems   Constitutional: Negative.  Negative for malaise/fatigue.   HENT: Negative.    Eyes: Negative.  Negative for blurred vision.   Respiratory: Negative.  Negative for shortness of breath.    Cardiovascular: Negative.  Negative for chest pain, palpitations, orthopnea and PND.   Gastrointestinal: Negative.    Endocrine: Negative.    Genitourinary: Negative.     Musculoskeletal: Negative.  Negative for myalgias and neck pain.   Skin: Negative.    Allergic/Immunologic: Negative.    Neurological: Negative.  Negative for focal weakness and headaches.   Hematological: Negative.    Psychiatric/Behavioral: Negative.        Objective   Physical Exam   Constitutional: He is oriented to person, place, and time. He appears well-developed and well-nourished. No distress.   HENT:   Head: Normocephalic and atraumatic.   Neck: Normal carotid pulses present. Carotid bruit is not present.   Cardiovascular: Normal rate, regular rhythm and normal heart sounds. Exam reveals no gallop and no friction rub.   Pulmonary/Chest: Effort normal and breath sounds normal. No stridor. No respiratory distress. He has no wheezes. He has no rales.   Musculoskeletal: He exhibits no edema.   Neurological: He is alert and oriented to person, place, and time.   Skin: Skin is warm and dry. No rash noted. He is not diaphoretic. No erythema. No pallor.   Psychiatric: He has a normal mood and affect. His behavior is normal.   Nursing note and vitals reviewed.      Assessment/Plan   Sharan was seen today for follow-up.    Diagnoses and all orders for this visit:    Type 2 diabetes mellitus with hyperglycemia, without long-term current use of insulin (CMS/[facility])  Comments:  uncontrolled    Hyperlipidemia, unspecified hyperlipidemia type    Essential hypertension    Class 1 obesity with serious comorbidity and body mass index (BMI) of 32.0 to 32.9 in adult, unspecified obesity type    Other orders  -     metFORMIN (GLUCOPHAGE) 500 MG tablet; Take 1 tablet by mouth 2 (Two) Times a Day With Meals.  -     Dulaglutide (TRULICITY) 0.75 MG/0.5ML solution pen-injector; Inject 0.75 mg under the skin into the appropriate area as directed 1 (One) Time Per Week.    Labs are reviewed at today's visit  Advised to monitor his FSBS closely  Started on metformin as above, take 1 po qhs x 1 week and then increase to bid  Start on  trulicity, verbal and written information given on how and when to use  He is aware and is in agreement to this plan  Will return for recheck with labs in 3 months   All questions and concerns are addressed with understanding noted.

## 2019-02-15 RX ORDER — ATORVASTATIN CALCIUM 20 MG/1
TABLET, FILM COATED ORAL
Qty: 30 TABLET | Refills: 5 | Status: SHIPPED | OUTPATIENT
Start: 2019-02-15 | End: 2019-10-28 | Stop reason: SDUPTHER

## 2019-02-25 ENCOUNTER — TRANSCRIBE ORDERS (OUTPATIENT)
Dept: GENERAL RADIOLOGY | Facility: CLINIC | Age: 54
End: 2019-02-25

## 2019-02-25 ENCOUNTER — LAB (OUTPATIENT)
Dept: LAB | Facility: OTHER | Age: 54
End: 2019-02-25

## 2019-02-25 DIAGNOSIS — N40.0 BPH WITHOUT URINARY OBSTRUCTION: Primary | ICD-10-CM

## 2019-02-25 DIAGNOSIS — N40.0 BPH WITHOUT URINARY OBSTRUCTION: ICD-10-CM

## 2019-02-25 LAB — PSA SERPL-MCNC: 1.11 NG/ML (ref 0–4)

## 2019-02-25 PROCEDURE — 84403 ASSAY OF TOTAL TESTOSTERONE: CPT | Performed by: UROLOGY

## 2019-02-25 PROCEDURE — 36415 COLL VENOUS BLD VENIPUNCTURE: CPT | Performed by: INTERNAL MEDICINE

## 2019-02-25 PROCEDURE — 84153 ASSAY OF PSA TOTAL: CPT | Performed by: UROLOGY

## 2019-02-27 LAB — TESTOST SERPL-MCNC: 804 NG/DL (ref 264–916)

## 2019-03-01 RX ORDER — GLIMEPIRIDE 4 MG/1
TABLET ORAL
Qty: 30 TABLET | Refills: 0 | Status: SHIPPED | OUTPATIENT
Start: 2019-03-01 | End: 2019-05-06 | Stop reason: SDUPTHER

## 2019-03-04 ENCOUNTER — LAB (OUTPATIENT)
Dept: LAB | Facility: OTHER | Age: 54
End: 2019-03-04

## 2019-03-04 ENCOUNTER — OFFICE VISIT (OUTPATIENT)
Dept: FAMILY MEDICINE CLINIC | Facility: CLINIC | Age: 54
End: 2019-03-04

## 2019-03-04 VITALS
HEIGHT: 66 IN | HEART RATE: 68 BPM | SYSTOLIC BLOOD PRESSURE: 130 MMHG | DIASTOLIC BLOOD PRESSURE: 78 MMHG | WEIGHT: 204.2 LBS | BODY MASS INDEX: 32.82 KG/M2 | TEMPERATURE: 97.9 F

## 2019-03-04 DIAGNOSIS — E78.5 HYPERLIPIDEMIA, UNSPECIFIED HYPERLIPIDEMIA TYPE: Chronic | ICD-10-CM

## 2019-03-04 DIAGNOSIS — E11.69 TYPE 2 DIABETES MELLITUS WITH OTHER SPECIFIED COMPLICATION, WITHOUT LONG-TERM CURRENT USE OF INSULIN (HCC): ICD-10-CM

## 2019-03-04 DIAGNOSIS — I10 ESSENTIAL HYPERTENSION: Chronic | ICD-10-CM

## 2019-03-04 DIAGNOSIS — E11.69 TYPE 2 DIABETES MELLITUS WITH OTHER SPECIFIED COMPLICATION, WITHOUT LONG-TERM CURRENT USE OF INSULIN (HCC): Primary | ICD-10-CM

## 2019-03-04 LAB
ALBUMIN SERPL-MCNC: 4.7 G/DL (ref 3.5–5)
ALBUMIN/GLOB SERPL: 1.4 G/DL (ref 1.1–1.8)
ALP SERPL-CCNC: 73 U/L (ref 38–126)
ALT SERPL W P-5'-P-CCNC: 45 U/L
ANION GAP SERPL CALCULATED.3IONS-SCNC: 10 MMOL/L (ref 5–15)
AST SERPL-CCNC: 28 U/L (ref 17–59)
BILIRUB SERPL-MCNC: 1.4 MG/DL (ref 0.2–1.3)
BUN BLD-MCNC: 12 MG/DL (ref 9–20)
BUN/CREAT SERPL: 9.2 (ref 7–25)
CALCIUM SPEC-SCNC: 9.6 MG/DL (ref 8.4–10.2)
CHLORIDE SERPL-SCNC: 101 MMOL/L (ref 98–107)
CO2 SERPL-SCNC: 28 MMOL/L (ref 22–30)
CREAT BLD-MCNC: 1.3 MG/DL (ref 0.66–1.25)
GFR SERPL CREATININE-BSD FRML MDRD: 58 ML/MIN/1.73 (ref 56–130)
GLOBULIN UR ELPH-MCNC: 3.3 GM/DL (ref 2.3–3.5)
GLUCOSE BLD-MCNC: 170 MG/DL (ref 74–99)
HBA1C MFR BLD: 9.6 % (ref 4–5.6)
POTASSIUM BLD-SCNC: 4.1 MMOL/L (ref 3.4–5)
PROT SERPL-MCNC: 8 G/DL (ref 6.3–8.2)
SODIUM BLD-SCNC: 139 MMOL/L (ref 137–145)

## 2019-03-04 PROCEDURE — 83036 HEMOGLOBIN GLYCOSYLATED A1C: CPT | Performed by: NURSE PRACTITIONER

## 2019-03-04 PROCEDURE — 36415 COLL VENOUS BLD VENIPUNCTURE: CPT | Performed by: NURSE PRACTITIONER

## 2019-03-04 PROCEDURE — 99213 OFFICE O/P EST LOW 20 MIN: CPT | Performed by: NURSE PRACTITIONER

## 2019-03-04 PROCEDURE — 80053 COMPREHEN METABOLIC PANEL: CPT | Performed by: NURSE PRACTITIONER

## 2019-03-08 NOTE — PROGRESS NOTES
Subjective   Sharan Gomez is a 53 y.o. male. Patient here today with complaints of Follow-up and Diabetes  Patient here today for recheck of diabetes, hypertension, hyperlipidemia, reporting fingerstick blood sugar on average 120 but ranging 103-260.  This is 1 month follow-up.  He states he has been following a much better diabetic diet, exercising, following a low carb diet as well.    Vitals:    03/04/19 0918   BP: 130/78   Pulse: 68   Temp: 97.9 °F (36.6 °C)     Past Medical History:   Diagnosis Date   • Elevated blood pressure    • Encounter for general adult medical examination without abnormal findings    • Essential hypertension    • Hyperlipidemia    • Kidney stone    • Left flank pain    • Low back pain    • Muscle strain    • Type 2 diabetes mellitus (CMS/[facility])      Diabetes   He presents for his follow-up diabetic visit. He has type 2 diabetes mellitus. His disease course has been improving. There are no hypoglycemic associated symptoms. There are no diabetic associated symptoms. There are no hypoglycemic complications. Symptoms are stable. There are no diabetic complications. Risk factors for coronary artery disease include diabetes mellitus, dyslipidemia, male sex, obesity and hypertension. Current diabetic treatment includes diet and oral agent (dual therapy) (Trulicity). He is compliant with treatment most of the time. His weight is stable. He is following a generally healthy diet. His home blood glucose trend is decreasing steadily. His breakfast blood glucose range is generally 110-130 mg/dl. An ACE inhibitor/angiotensin II receptor blocker is being taken.   Hypertension   This is a chronic problem. The current episode started more than 1 year ago. The problem is unchanged. The problem is controlled. There are no associated agents to hypertension. Risk factors for coronary artery disease include obesity, male gender, dyslipidemia and diabetes mellitus. Past treatments include lifestyle  changes and angiotensin blockers. Current antihypertension treatment includes lifestyle changes and angiotensin blockers. The current treatment provides moderate improvement. Compliance problems include exercise and diet.         The following portions of the patient's history were reviewed and updated as appropriate: allergies, current medications, past family history, past medical history, past social history, past surgical history and problem list.    Review of Systems   Constitutional: Negative.    HENT: Negative.    Eyes: Negative.    Respiratory: Negative.    Cardiovascular: Negative.    Gastrointestinal: Negative.    Endocrine: Negative.    Genitourinary: Negative.    Musculoskeletal: Negative.    Skin: Negative.    Allergic/Immunologic: Negative.    Neurological: Negative.    Hematological: Negative.    Psychiatric/Behavioral: Negative.        Objective   Physical Exam   Constitutional: He is oriented to person, place, and time. He appears well-developed and well-nourished. No distress.   HENT:   Head: Normocephalic and atraumatic.   Neck: Normal carotid pulses present. Carotid bruit is not present.   Cardiovascular: Normal rate, regular rhythm, normal heart sounds and intact distal pulses. Exam reveals no gallop and no friction rub.   No murmur heard.  Pulmonary/Chest: Effort normal and breath sounds normal. No stridor. No respiratory distress. He has no wheezes. He has no rales.   Musculoskeletal: He exhibits no edema.   Neurological: He is alert and oriented to person, place, and time.   Skin: Skin is warm and dry. No rash noted. He is not diaphoretic. No erythema.   Psychiatric: He has a normal mood and affect. His behavior is normal.   Nursing note and vitals reviewed.      Assessment/Plan   Sharan was seen today for follow-up and diabetes.    Diagnoses and all orders for this visit:    Type 2 diabetes mellitus with other specified complication, without long-term current use of insulin (CMS/[facility])  -      Comprehensive metabolic panel; Future  -     Hemoglobin A1c; Future    Hyperlipidemia, unspecified hyperlipidemia type    Essential hypertension    CMP and A1c repeated today, although still elevated much improved from last visit and last labs, continue at present on low carb, diabetic diet, Glucophage, glimepiride and Trulicity.  Follow-up here again in 3 months for recheck sooner if needed and will need to have her labs repeated at that point.  Patient aware and in agreement to this plan.  All questions and concerns are addressed with understanding noted.

## 2019-03-25 RX ORDER — LOSARTAN POTASSIUM 50 MG/1
TABLET ORAL
Qty: 30 TABLET | Refills: 5 | Status: SHIPPED | OUTPATIENT
Start: 2019-03-25 | End: 2019-11-20 | Stop reason: SDUPTHER

## 2019-04-26 RX ORDER — DULAGLUTIDE 0.75 MG/.5ML
INJECTION, SOLUTION SUBCUTANEOUS
Qty: 2 ML | Refills: 3 | Status: SHIPPED | OUTPATIENT
Start: 2019-04-26 | End: 2019-06-05 | Stop reason: DRUGHIGH

## 2019-05-07 RX ORDER — GLIMEPIRIDE 4 MG/1
TABLET ORAL
Qty: 30 TABLET | Refills: 0 | Status: SHIPPED | OUTPATIENT
Start: 2019-05-07 | End: 2019-06-20 | Stop reason: SDUPTHER

## 2019-06-03 ENCOUNTER — LAB (OUTPATIENT)
Dept: LAB | Facility: OTHER | Age: 54
End: 2019-06-03

## 2019-06-03 ENCOUNTER — OFFICE VISIT (OUTPATIENT)
Dept: FAMILY MEDICINE CLINIC | Facility: CLINIC | Age: 54
End: 2019-06-03

## 2019-06-03 VITALS
BODY MASS INDEX: 33.91 KG/M2 | SYSTOLIC BLOOD PRESSURE: 140 MMHG | DIASTOLIC BLOOD PRESSURE: 80 MMHG | HEIGHT: 66 IN | HEART RATE: 73 BPM | WEIGHT: 211 LBS | TEMPERATURE: 97.6 F

## 2019-06-03 DIAGNOSIS — E11.65 TYPE 2 DIABETES MELLITUS WITH HYPERGLYCEMIA, WITHOUT LONG-TERM CURRENT USE OF INSULIN (HCC): Primary | Chronic | ICD-10-CM

## 2019-06-03 DIAGNOSIS — E11.65 TYPE 2 DIABETES MELLITUS WITH HYPERGLYCEMIA, WITHOUT LONG-TERM CURRENT USE OF INSULIN (HCC): Chronic | ICD-10-CM

## 2019-06-03 DIAGNOSIS — E78.5 HYPERLIPIDEMIA, UNSPECIFIED HYPERLIPIDEMIA TYPE: Chronic | ICD-10-CM

## 2019-06-03 DIAGNOSIS — I10 ESSENTIAL HYPERTENSION: Chronic | ICD-10-CM

## 2019-06-03 PROBLEM — K21.9 GASTROESOPHAGEAL REFLUX DISEASE: Status: ACTIVE | Noted: 2019-06-03

## 2019-06-03 PROBLEM — C64.9: Status: ACTIVE | Noted: 2019-06-03

## 2019-06-03 PROBLEM — N28.1 SIMPLE RENAL CYST: Status: ACTIVE | Noted: 2019-06-03

## 2019-06-03 PROBLEM — N23 RENAL COLIC ON LEFT SIDE: Status: ACTIVE | Noted: 2019-06-03

## 2019-06-03 LAB
ALBUMIN SERPL-MCNC: 4.4 G/DL (ref 3.5–5)
ALBUMIN/GLOB SERPL: 1.3 G/DL (ref 1.1–1.8)
ALP SERPL-CCNC: 85 U/L (ref 38–126)
ALT SERPL W P-5'-P-CCNC: 66 U/L
ANION GAP SERPL CALCULATED.3IONS-SCNC: 10 MMOL/L (ref 5–15)
AST SERPL-CCNC: 34 U/L (ref 17–59)
BILIRUB SERPL-MCNC: 1.7 MG/DL (ref 0.2–1.3)
BUN BLD-MCNC: 17 MG/DL (ref 7–23)
BUN/CREAT SERPL: 15.2 (ref 7–25)
CALCIUM SPEC-SCNC: 9.5 MG/DL (ref 8.4–10.2)
CHLORIDE SERPL-SCNC: 103 MMOL/L (ref 101–112)
CO2 SERPL-SCNC: 25 MMOL/L (ref 22–30)
CREAT BLD-MCNC: 1.12 MG/DL (ref 0.7–1.3)
GFR SERPL CREATININE-BSD FRML MDRD: 69 ML/MIN/1.73 (ref 56–130)
GLOBULIN UR ELPH-MCNC: 3.5 GM/DL (ref 2.3–3.5)
GLUCOSE BLD-MCNC: 154 MG/DL (ref 70–99)
POTASSIUM BLD-SCNC: 3.6 MMOL/L (ref 3.4–5)
PROT SERPL-MCNC: 7.9 G/DL (ref 6.3–8.6)
SODIUM BLD-SCNC: 138 MMOL/L (ref 137–145)

## 2019-06-03 PROCEDURE — 99213 OFFICE O/P EST LOW 20 MIN: CPT | Performed by: NURSE PRACTITIONER

## 2019-06-03 PROCEDURE — 80053 COMPREHEN METABOLIC PANEL: CPT | Performed by: NURSE PRACTITIONER

## 2019-06-03 PROCEDURE — 83036 HEMOGLOBIN GLYCOSYLATED A1C: CPT | Performed by: NURSE PRACTITIONER

## 2019-06-03 PROCEDURE — 36415 COLL VENOUS BLD VENIPUNCTURE: CPT | Performed by: NURSE PRACTITIONER

## 2019-06-03 NOTE — PROGRESS NOTES
Subjective   Sharan Gomez is a 53 y.o. male. Patient here today with complaints of Follow-up and Diabetes  pt here today for recheck of diabetes, states fsbs , fasting, running 115-130 at home. No complaints today, relates is still taking amaryl, metformin and trulicity. Denies side effects of these. Has not had meds yet this am, due for labs and states is fasting today.     Vitals:    06/03/19 0905   BP: 140/80   Pulse: 73   Temp: 97.6 °F (36.4 °C)     Past Medical History:   Diagnosis Date   • Elevated blood pressure    • Encounter for general adult medical examination without abnormal findings    • Essential hypertension    • Hyperlipidemia    • Kidney stone    • Left flank pain    • Low back pain    • Muscle strain    • Type 2 diabetes mellitus (CMS/HCC)      Diabetes   He presents for his follow-up diabetic visit. He has type 2 diabetes mellitus. His disease course has been improving. There are no hypoglycemic associated symptoms. Pertinent negatives for hypoglycemia include no headaches. There are no diabetic associated symptoms. Pertinent negatives for diabetes include no chest pain. There are no hypoglycemic complications. Symptoms are stable. There are no diabetic complications. Risk factors for coronary artery disease include male sex, hypertension, obesity and dyslipidemia. Current diabetic treatment includes diet and oral agent (dual therapy) (trulicity). He is compliant with treatment most of the time. His weight is increasing steadily. He is following a generally healthy diet. Meal planning includes calorie counting and carbohydrate counting. His home blood glucose trend is decreasing steadily. His breakfast blood glucose range is generally 110-130 mg/dl. An ACE inhibitor/angiotensin II receptor blocker is being taken.   Hypertension   This is a chronic problem. The current episode started today. The problem has been gradually worsening since onset. Pertinent negatives include no chest pain,  headaches or shortness of breath. Risk factors for coronary artery disease include stress, obesity, male gender, dyslipidemia and diabetes mellitus. Past treatments include lifestyle changes and angiotensin blockers. Current antihypertension treatment includes lifestyle changes and angiotensin blockers. The current treatment provides mild improvement. Compliance problems include exercise, diet and psychosocial issues.         The following portions of the patient's history were reviewed and updated as appropriate: allergies, current medications, past family history, past medical history, past social history, past surgical history and problem list.    Review of Systems   Constitutional: Negative.    HENT: Negative.    Eyes: Negative.    Respiratory: Negative.  Negative for shortness of breath.    Cardiovascular: Negative.  Negative for chest pain.   Gastrointestinal: Negative.    Endocrine: Negative.    Genitourinary: Negative.    Musculoskeletal: Negative.    Skin: Negative.    Allergic/Immunologic: Negative.    Neurological: Negative.  Negative for headaches.   Hematological: Negative.    Psychiatric/Behavioral: Negative.        Objective   Physical Exam   Constitutional: He is oriented to person, place, and time. He appears well-developed and well-nourished. No distress.   HENT:   Head: Normocephalic and atraumatic.   Neck: Normal carotid pulses present. Carotid bruit is not present.   Cardiovascular: Normal rate, regular rhythm and normal heart sounds. Exam reveals no gallop and no friction rub.   No murmur heard.  Pulmonary/Chest: Effort normal and breath sounds normal. No stridor. No respiratory distress. He has no wheezes. He has no rales.   Neurological: He is alert and oriented to person, place, and time.   Skin: Skin is warm and dry. No rash noted. He is not diaphoretic. No erythema. No pallor.   Psychiatric: He has a normal mood and affect. His behavior is normal. Judgment and thought content normal.    Nursing note and vitals reviewed.      Assessment/Plan   Sharan was seen today for follow-up and diabetes.    Diagnoses and all orders for this visit:    Type 2 diabetes mellitus with hyperglycemia, without long-term current use of insulin (CMS/[facility])  -     Hemoglobin A1c; Future  -     Comprehensive metabolic panel; Future    Hyperlipidemia, unspecified hyperlipidemia type    Essential hypertension      Encouraged to continue with diabetic diet and exercise, continue with metformin , amaryl and trulicity for diabetes control  Will repeat a1c and cmp today, inform of results via phone  If a1c under good control, follow up in 6 months for recheck, 3 months if still uncontrolled  He is aware and is in agreement to this plan  All questions and concerns are addressed with understanding noted.

## 2019-06-03 NOTE — PATIENT INSTRUCTIONS

## 2019-06-04 LAB — HBA1C MFR BLD: 8.06 % (ref 4.8–5.6)

## 2019-06-21 RX ORDER — GLIMEPIRIDE 4 MG/1
TABLET ORAL
Qty: 30 TABLET | Refills: 0 | Status: SHIPPED | OUTPATIENT
Start: 2019-06-21 | End: 2019-09-18 | Stop reason: SDUPTHER

## 2019-09-19 RX ORDER — GLIMEPIRIDE 4 MG/1
TABLET ORAL
Qty: 30 TABLET | Refills: 0 | Status: SHIPPED | OUTPATIENT
Start: 2019-09-19 | End: 2019-10-28 | Stop reason: SDUPTHER

## 2019-10-11 RX ORDER — DULAGLUTIDE 1.5 MG/.5ML
INJECTION, SOLUTION SUBCUTANEOUS
Qty: 2 ML | OUTPATIENT
Start: 2019-10-11

## 2019-10-11 RX ORDER — ATORVASTATIN CALCIUM 20 MG/1
TABLET, FILM COATED ORAL
Qty: 30 TABLET | Refills: 5 | OUTPATIENT
Start: 2019-10-11

## 2019-10-28 RX ORDER — GLIMEPIRIDE 4 MG/1
TABLET ORAL
Qty: 30 TABLET | Refills: 0 | Status: SHIPPED | OUTPATIENT
Start: 2019-10-28 | End: 2019-11-26 | Stop reason: SDUPTHER

## 2019-10-28 RX ORDER — DULAGLUTIDE 1.5 MG/.5ML
INJECTION, SOLUTION SUBCUTANEOUS
Qty: 2 ML | Refills: 0 | Status: SHIPPED | OUTPATIENT
Start: 2019-10-28 | End: 2019-11-26 | Stop reason: SDUPTHER

## 2019-10-28 RX ORDER — ATORVASTATIN CALCIUM 20 MG/1
TABLET, FILM COATED ORAL
Qty: 30 TABLET | Refills: 5 | Status: SHIPPED | OUTPATIENT
Start: 2019-10-28 | End: 2020-05-07

## 2019-11-21 RX ORDER — LOSARTAN POTASSIUM 50 MG/1
TABLET ORAL
Qty: 30 TABLET | Refills: 5 | Status: SHIPPED | OUTPATIENT
Start: 2019-11-21 | End: 2020-06-08

## 2019-11-26 RX ORDER — GLIMEPIRIDE 4 MG/1
TABLET ORAL
Qty: 30 TABLET | Refills: 0 | Status: SHIPPED | OUTPATIENT
Start: 2019-11-26 | End: 2020-01-06 | Stop reason: SDUPTHER

## 2019-11-26 RX ORDER — DULAGLUTIDE 1.5 MG/.5ML
INJECTION, SOLUTION SUBCUTANEOUS
Qty: 2 ML | Refills: 0 | Status: SHIPPED | OUTPATIENT
Start: 2019-11-26 | End: 2020-01-06 | Stop reason: SDUPTHER

## 2019-12-30 RX ORDER — GLIMEPIRIDE 4 MG/1
TABLET ORAL
Qty: 30 TABLET | Refills: 0 | OUTPATIENT
Start: 2019-12-30

## 2019-12-30 RX ORDER — DULAGLUTIDE 1.5 MG/.5ML
INJECTION, SOLUTION SUBCUTANEOUS
Qty: 2 ML | Refills: 0 | OUTPATIENT
Start: 2019-12-30

## 2020-01-06 DIAGNOSIS — E11.9 TYPE 2 DIABETES MELLITUS WITHOUT COMPLICATION, WITHOUT LONG-TERM CURRENT USE OF INSULIN (HCC): Primary | ICD-10-CM

## 2020-01-06 RX ORDER — GLIMEPIRIDE 4 MG/1
4 TABLET ORAL
Qty: 30 TABLET | Refills: 0 | Status: SHIPPED | OUTPATIENT
Start: 2020-01-06 | End: 2020-02-04

## 2020-01-07 ENCOUNTER — LAB (OUTPATIENT)
Dept: LAB | Facility: OTHER | Age: 55
End: 2020-01-07

## 2020-01-07 DIAGNOSIS — E11.9 TYPE 2 DIABETES MELLITUS WITHOUT COMPLICATION, WITHOUT LONG-TERM CURRENT USE OF INSULIN (HCC): ICD-10-CM

## 2020-01-07 LAB
ALBUMIN SERPL-MCNC: 4.6 G/DL (ref 3.5–5)
ALBUMIN/GLOB SERPL: 1.2 G/DL (ref 1.1–1.8)
ALP SERPL-CCNC: 88 U/L (ref 38–126)
ALT SERPL W P-5'-P-CCNC: 81 U/L
ANION GAP SERPL CALCULATED.3IONS-SCNC: 9 MMOL/L (ref 5–15)
AST SERPL-CCNC: 44 U/L (ref 17–59)
BASOPHILS # BLD AUTO: 0.05 10*3/MM3 (ref 0–0.2)
BASOPHILS NFR BLD AUTO: 0.5 % (ref 0–1.5)
BILIRUB SERPL-MCNC: 1.6 MG/DL (ref 0.2–1.3)
BILIRUB UR QL STRIP: NEGATIVE
BUN BLD-MCNC: 16 MG/DL (ref 7–23)
BUN/CREAT SERPL: 15.7 (ref 7–25)
CALCIUM SPEC-SCNC: 9.2 MG/DL (ref 8.4–10.2)
CHLORIDE SERPL-SCNC: 102 MMOL/L (ref 101–112)
CHOLEST SERPL-MCNC: 167 MG/DL (ref 150–200)
CLARITY UR: CLEAR
CO2 SERPL-SCNC: 26 MMOL/L (ref 22–30)
COLOR UR: YELLOW
CREAT BLD-MCNC: 1.02 MG/DL (ref 0.7–1.3)
DEPRECATED RDW RBC AUTO: 37.7 FL (ref 37–54)
EOSINOPHIL # BLD AUTO: 0.23 10*3/MM3 (ref 0–0.4)
EOSINOPHIL NFR BLD AUTO: 2.3 % (ref 0.3–6.2)
ERYTHROCYTE [DISTWIDTH] IN BLOOD BY AUTOMATED COUNT: 12.1 % (ref 12.3–15.4)
GFR SERPL CREATININE-BSD FRML MDRD: 77 ML/MIN/1.73 (ref 56–130)
GLOBULIN UR ELPH-MCNC: 3.7 GM/DL (ref 2.3–3.5)
GLUCOSE BLD-MCNC: 224 MG/DL (ref 70–99)
GLUCOSE UR STRIP-MCNC: ABNORMAL MG/DL
HBA1C MFR BLD: 9.4 % (ref 4.8–5.6)
HCT VFR BLD AUTO: 47.1 % (ref 37.5–51)
HDLC SERPL-MCNC: 40 MG/DL (ref 40–59)
HGB BLD-MCNC: 16.9 G/DL (ref 13–17.7)
HGB UR QL STRIP.AUTO: NEGATIVE
KETONES UR QL STRIP: NEGATIVE
LDLC SERPL CALC-MCNC: 108 MG/DL
LDLC/HDLC SERPL: 2.7 {RATIO} (ref 0–3.55)
LEUKOCYTE ESTERASE UR QL STRIP.AUTO: NEGATIVE
LYMPHOCYTES # BLD AUTO: 2.53 10*3/MM3 (ref 0.7–3.1)
LYMPHOCYTES NFR BLD AUTO: 25.5 % (ref 19.6–45.3)
MCH RBC QN AUTO: 30.8 PG (ref 26.6–33)
MCHC RBC AUTO-ENTMCNC: 35.9 G/DL (ref 31.5–35.7)
MCV RBC AUTO: 85.8 FL (ref 79–97)
MONOCYTES # BLD AUTO: 1.04 10*3/MM3 (ref 0.1–0.9)
MONOCYTES NFR BLD AUTO: 10.5 % (ref 5–12)
NEUTROPHILS # BLD AUTO: 6.07 10*3/MM3 (ref 1.7–7)
NEUTROPHILS NFR BLD AUTO: 61.2 % (ref 42.7–76)
NITRITE UR QL STRIP: NEGATIVE
PH UR STRIP.AUTO: 6 [PH] (ref 5.5–8)
PLATELET # BLD AUTO: 236 10*3/MM3 (ref 140–450)
PMV BLD AUTO: 9.6 FL (ref 6–12)
POTASSIUM BLD-SCNC: 4 MMOL/L (ref 3.4–5)
PROT SERPL-MCNC: 8.3 G/DL (ref 6.3–8.6)
PROT UR QL STRIP: NEGATIVE
RBC # BLD AUTO: 5.49 10*6/MM3 (ref 4.14–5.8)
SODIUM BLD-SCNC: 137 MMOL/L (ref 137–145)
SP GR UR STRIP: 1.02 (ref 1–1.03)
T3FREE SERPL-MCNC: 4.12 PG/ML (ref 2–4.4)
T4 SERPL-MCNC: 8.63 MCG/DL (ref 4.5–11.7)
TRIGL SERPL-MCNC: 96 MG/DL
TSH SERPL DL<=0.05 MIU/L-ACNC: 1.11 UIU/ML (ref 0.27–4.2)
UROBILINOGEN UR QL STRIP: ABNORMAL
VLDLC SERPL-MCNC: 19.2 MG/DL
WBC NRBC COR # BLD: 9.92 10*3/MM3 (ref 3.4–10.8)

## 2020-01-07 PROCEDURE — 84481 FREE ASSAY (FT-3): CPT | Performed by: NURSE PRACTITIONER

## 2020-01-07 PROCEDURE — 81003 URINALYSIS AUTO W/O SCOPE: CPT | Performed by: NURSE PRACTITIONER

## 2020-01-07 PROCEDURE — 83036 HEMOGLOBIN GLYCOSYLATED A1C: CPT | Performed by: NURSE PRACTITIONER

## 2020-01-07 PROCEDURE — 36415 COLL VENOUS BLD VENIPUNCTURE: CPT | Performed by: NURSE PRACTITIONER

## 2020-01-07 PROCEDURE — 80061 LIPID PANEL: CPT | Performed by: NURSE PRACTITIONER

## 2020-01-07 PROCEDURE — 85025 COMPLETE CBC W/AUTO DIFF WBC: CPT | Performed by: NURSE PRACTITIONER

## 2020-01-07 PROCEDURE — 84443 ASSAY THYROID STIM HORMONE: CPT | Performed by: NURSE PRACTITIONER

## 2020-01-07 PROCEDURE — 80053 COMPREHEN METABOLIC PANEL: CPT | Performed by: NURSE PRACTITIONER

## 2020-01-07 PROCEDURE — 84436 ASSAY OF TOTAL THYROXINE: CPT | Performed by: NURSE PRACTITIONER

## 2020-01-08 ENCOUNTER — OFFICE VISIT (OUTPATIENT)
Dept: FAMILY MEDICINE CLINIC | Facility: CLINIC | Age: 55
End: 2020-01-08

## 2020-01-08 VITALS
BODY MASS INDEX: 33.85 KG/M2 | HEART RATE: 68 BPM | TEMPERATURE: 97.1 F | HEIGHT: 66 IN | DIASTOLIC BLOOD PRESSURE: 78 MMHG | WEIGHT: 210.6 LBS | SYSTOLIC BLOOD PRESSURE: 112 MMHG

## 2020-01-08 DIAGNOSIS — I10 ESSENTIAL HYPERTENSION: Chronic | ICD-10-CM

## 2020-01-08 DIAGNOSIS — E78.5 HYPERLIPIDEMIA, UNSPECIFIED HYPERLIPIDEMIA TYPE: Primary | Chronic | ICD-10-CM

## 2020-01-08 DIAGNOSIS — E11.65 TYPE 2 DIABETES MELLITUS WITH HYPERGLYCEMIA, WITHOUT LONG-TERM CURRENT USE OF INSULIN (HCC): Chronic | ICD-10-CM

## 2020-01-08 DIAGNOSIS — E66.09 CLASS 1 OBESITY DUE TO EXCESS CALORIES WITH SERIOUS COMORBIDITY AND BODY MASS INDEX (BMI) OF 34.0 TO 34.9 IN ADULT: Chronic | ICD-10-CM

## 2020-01-08 DIAGNOSIS — C64.9 PRIMARY ADENOCARCINOMA OF KIDNEY, UNSPECIFIED LATERALITY (HCC): ICD-10-CM

## 2020-01-08 PROBLEM — E11.9 DIABETES MELLITUS: Status: ACTIVE | Noted: 2020-01-08

## 2020-01-08 PROBLEM — E78.00 HIGH BLOOD CHOLESTEROL: Status: ACTIVE | Noted: 2020-01-08

## 2020-01-08 PROCEDURE — 99214 OFFICE O/P EST MOD 30 MIN: CPT | Performed by: NURSE PRACTITIONER

## 2020-01-08 RX ORDER — METFORMIN HYDROCHLORIDE EXTENDED-RELEASE TABLETS 1000 MG/1
1000 TABLET, FILM COATED, EXTENDED RELEASE ORAL
Qty: 30 TABLET | Refills: 5 | Status: SHIPPED | OUTPATIENT
Start: 2020-01-08 | End: 2021-01-08 | Stop reason: ALTCHOICE

## 2020-01-08 NOTE — PROGRESS NOTES
"Subjective   Sharan Gomez is a 54 y.o. male. Patient here today with complaints of Diabetes  pt here today for recheck of htn, hyperlipidemia, diabetes. Reports has not really been following a good diabetic diet over the holidays. He does have hx renal cancer, continues follow up with dr rodriguez yearly , denies chest pain, shortness of breath, headache. Does report nausea, GI upset with metformin. Requesting to change to XR to see if side effects are improved.     Vitals:    01/08/20 0754   BP: 112/78   Pulse: 68   Temp: 97.1 °F (36.2 °C)   Weight: 95.5 kg (210 lb 9.6 oz)   Height: 167.6 cm (66\")   PainSc: 0-No pain     Body mass index is 33.99 kg/m².  Past Medical History:   Diagnosis Date   • Elevated blood pressure    • Encounter for general adult medical examination without abnormal findings    • Essential hypertension    • Hyperlipidemia    • Kidney stone    • Left flank pain    • Low back pain    • Muscle strain    • Type 2 diabetes mellitus (CMS/HCC)      Hypertension   This is a chronic problem. The current episode started more than 1 year ago. The problem is unchanged. The problem is controlled. Pertinent negatives include no chest pain, headaches or shortness of breath. There are no associated agents to hypertension. Risk factors for coronary artery disease include dyslipidemia, diabetes mellitus, male gender and obesity. Past treatments include lifestyle changes and angiotensin blockers. Current antihypertension treatment includes lifestyle changes and angiotensin blockers. The current treatment provides moderate improvement. Compliance problems include exercise and diet.  There is no history of chronic renal disease.   Diabetes   He presents for his follow-up diabetic visit. He has type 2 diabetes mellitus. His disease course has been stable. There are no hypoglycemic associated symptoms. Pertinent negatives for hypoglycemia include no headaches. There are no diabetic associated symptoms. " Pertinent negatives for diabetes include no chest pain. There are no hypoglycemic complications. Symptoms are stable. There are no diabetic complications. Risk factors for coronary artery disease include male sex, hypertension, obesity, dyslipidemia and diabetes mellitus. Current diabetic treatment includes diet and oral agent (dual therapy) (trulicity ). He is compliant with treatment most of the time. His weight is stable. He is following a generally unhealthy diet. An ACE inhibitor/angiotensin II receptor blocker is being taken.   Hyperlipidemia   This is a chronic problem. The current episode started more than 1 year ago. Exacerbating diseases include diabetes and obesity. He has no history of chronic renal disease, hypothyroidism, liver disease or nephrotic syndrome. Factors aggravating his hyperlipidemia include fatty foods. Pertinent negatives include no chest pain, focal sensory loss, focal weakness, leg pain, myalgias or shortness of breath. Current antihyperlipidemic treatment includes statins, diet change and exercise. Compliance problems include adherence to exercise and adherence to diet.  Risk factors for coronary artery disease include obesity, male sex, hypertension, dyslipidemia and diabetes mellitus.        The following portions of the patient's history were reviewed and updated as appropriate: allergies, current medications, past family history, past medical history, past social history, past surgical history and problem list.    Review of Systems   Constitutional: Negative.    HENT: Negative.    Eyes: Negative.    Respiratory: Negative.  Negative for shortness of breath.    Cardiovascular: Negative.  Negative for chest pain.   Gastrointestinal: Negative.    Endocrine: Negative.    Genitourinary: Negative.    Musculoskeletal: Negative.  Negative for myalgias.   Skin: Negative.    Allergic/Immunologic: Negative.    Neurological: Negative.  Negative for focal weakness and headaches.    Hematological: Negative.    Psychiatric/Behavioral: Negative.        Objective   Physical Exam   Constitutional: He is oriented to person, place, and time. He appears well-developed and well-nourished. No distress.   HENT:   Head: Normocephalic and atraumatic.   Neck: Normal carotid pulses present. Carotid bruit is not present.   Cardiovascular: Normal rate, regular rhythm, normal heart sounds and intact distal pulses. Exam reveals no gallop and no friction rub.   No murmur heard.  Pulmonary/Chest: Effort normal and breath sounds normal. No stridor. No respiratory distress. He has no wheezes. He has no rales.   Abdominal:   Rounded with obesity    Musculoskeletal: He exhibits no edema.   Neurological: He is alert and oriented to person, place, and time.   Skin: Skin is warm and dry. No rash noted. He is not diaphoretic. No erythema. No pallor.   Psychiatric: He has a normal mood and affect. His behavior is normal. Judgment and thought content normal.   Nursing note and vitals reviewed.      Assessment/Plan   Sharan was seen today for diabetes.    Diagnoses and all orders for this visit:    Hyperlipidemia, unspecified hyperlipidemia type    Essential hypertension    Primary adenocarcinoma of kidney, unspecified laterality (CMS/[facility])  Comments:  follows up with dr rodriguez yearly     Type 2 diabetes mellitus with hyperglycemia, without long-term current use of insulin (CMS/[facility])  Comments:  uncontrolled    Class 1 obesity due to excess calories with serious comorbidity and body mass index (BMI) of 34.0 to 34.9 in adult  Comments:  uncontrolled    Other orders  -     Dulaglutide (TRULICITY) 1.5 MG/0.5ML solution pen-injector; Inject 1.5 mg under the skin into the appropriate area as directed 1 (One) Time Per Week.  -     metFORMIN (FORTAMET) 1000 MG (OSM) 24 hr tablet; Take 1 tablet by mouth Daily With Breakfast.    Labs are reviewed at today's visit  He is given refills on trulicity as above, states he already has  been given refills on all other medicines.   Will change metformin to XR and increase dose to 1000mg daily  Will repeat labs in 3 months including a1c, encouraged to follow better diabetic diet , exercise  He is aware and is in agreement to this plan  All questions and concerns are addressed with understanding noted.

## 2020-01-08 NOTE — PATIENT INSTRUCTIONS

## 2020-02-04 RX ORDER — GLIMEPIRIDE 4 MG/1
TABLET ORAL
Qty: 30 TABLET | Refills: 0 | Status: SHIPPED | OUTPATIENT
Start: 2020-02-04 | End: 2020-03-18

## 2020-03-18 RX ORDER — GLIMEPIRIDE 4 MG/1
TABLET ORAL
Qty: 30 TABLET | Refills: 0 | Status: SHIPPED | OUTPATIENT
Start: 2020-03-18 | End: 2020-04-14

## 2020-04-14 RX ORDER — GLIMEPIRIDE 4 MG/1
TABLET ORAL
Qty: 30 TABLET | Refills: 0 | Status: SHIPPED | OUTPATIENT
Start: 2020-04-14 | End: 2020-05-14

## 2020-05-07 RX ORDER — ATORVASTATIN CALCIUM 20 MG/1
TABLET, FILM COATED ORAL
Qty: 30 TABLET | Refills: 5 | Status: SHIPPED | OUTPATIENT
Start: 2020-05-07 | End: 2020-11-02

## 2020-05-14 RX ORDER — GLIMEPIRIDE 4 MG/1
TABLET ORAL
Qty: 30 TABLET | Refills: 0 | Status: SHIPPED | OUTPATIENT
Start: 2020-05-14 | End: 2020-06-12

## 2020-06-08 RX ORDER — LOSARTAN POTASSIUM 50 MG/1
TABLET ORAL
Qty: 30 TABLET | Refills: 5 | Status: SHIPPED | OUTPATIENT
Start: 2020-06-08 | End: 2020-12-07

## 2020-06-12 RX ORDER — GLIMEPIRIDE 4 MG/1
TABLET ORAL
Qty: 30 TABLET | Refills: 0 | Status: SHIPPED | OUTPATIENT
Start: 2020-06-12 | End: 2020-07-10 | Stop reason: SDUPTHER

## 2020-07-10 RX ORDER — GLIMEPIRIDE 4 MG/1
4 TABLET ORAL
Qty: 30 TABLET | Refills: 0 | Status: SHIPPED | OUTPATIENT
Start: 2020-07-10 | End: 2020-08-11 | Stop reason: SDUPTHER

## 2020-08-03 RX ORDER — DULAGLUTIDE 1.5 MG/.5ML
INJECTION, SOLUTION SUBCUTANEOUS
Qty: 2 ML | Refills: 5 | OUTPATIENT
Start: 2020-08-03

## 2020-08-11 DIAGNOSIS — Z79.4 TYPE 2 DIABETES MELLITUS WITH DIABETIC AUTONOMIC NEUROPATHY, WITH LONG-TERM CURRENT USE OF INSULIN (HCC): Primary | ICD-10-CM

## 2020-08-11 DIAGNOSIS — E11.43 TYPE 2 DIABETES MELLITUS WITH DIABETIC AUTONOMIC NEUROPATHY, WITH LONG-TERM CURRENT USE OF INSULIN (HCC): Primary | ICD-10-CM

## 2020-08-11 RX ORDER — GLIMEPIRIDE 4 MG/1
4 TABLET ORAL
Qty: 30 TABLET | Refills: 0 | Status: SHIPPED | OUTPATIENT
Start: 2020-08-11 | End: 2020-09-09

## 2020-08-18 ENCOUNTER — LAB (OUTPATIENT)
Dept: LAB | Facility: OTHER | Age: 55
End: 2020-08-18

## 2020-08-18 DIAGNOSIS — E11.43 TYPE 2 DIABETES MELLITUS WITH DIABETIC AUTONOMIC NEUROPATHY, WITH LONG-TERM CURRENT USE OF INSULIN (HCC): ICD-10-CM

## 2020-08-18 DIAGNOSIS — Z79.4 TYPE 2 DIABETES MELLITUS WITH DIABETIC AUTONOMIC NEUROPATHY, WITH LONG-TERM CURRENT USE OF INSULIN (HCC): ICD-10-CM

## 2020-08-18 LAB
ALBUMIN SERPL-MCNC: 4.2 G/DL (ref 3.5–5)
ALBUMIN/GLOB SERPL: 1.1 G/DL (ref 1.1–1.8)
ALP SERPL-CCNC: 103 U/L (ref 38–126)
ALT SERPL W P-5'-P-CCNC: 54 U/L
ANION GAP SERPL CALCULATED.3IONS-SCNC: 9 MMOL/L (ref 5–15)
AST SERPL-CCNC: 40 U/L (ref 17–59)
BACTERIA UR QL AUTO: ABNORMAL /HPF
BASOPHILS # BLD AUTO: 0.05 10*3/MM3 (ref 0–0.2)
BASOPHILS NFR BLD AUTO: 0.5 % (ref 0–1.5)
BILIRUB SERPL-MCNC: 2.2 MG/DL (ref 0.2–1.3)
BILIRUB UR QL STRIP: ABNORMAL
BUN SERPL-MCNC: 11 MG/DL (ref 7–23)
BUN/CREAT SERPL: 9 (ref 7–25)
CALCIUM SPEC-SCNC: 9.5 MG/DL (ref 8.4–10.2)
CHLORIDE SERPL-SCNC: 99 MMOL/L (ref 101–112)
CHOLEST SERPL-MCNC: 142 MG/DL (ref 150–200)
CLARITY UR: ABNORMAL
CO2 SERPL-SCNC: 29 MMOL/L (ref 22–30)
COLOR UR: ABNORMAL
CREAT SERPL-MCNC: 1.22 MG/DL (ref 0.7–1.3)
DEPRECATED RDW RBC AUTO: 37.8 FL (ref 37–54)
EOSINOPHIL # BLD AUTO: 0.26 10*3/MM3 (ref 0–0.4)
EOSINOPHIL NFR BLD AUTO: 2.8 % (ref 0.3–6.2)
ERYTHROCYTE [DISTWIDTH] IN BLOOD BY AUTOMATED COUNT: 12 % (ref 12.3–15.4)
GFR SERPL CREATININE-BSD FRML MDRD: 62 ML/MIN/1.73 (ref 56–130)
GLOBULIN UR ELPH-MCNC: 3.7 GM/DL (ref 2.3–3.5)
GLUCOSE SERPL-MCNC: 237 MG/DL (ref 70–99)
GLUCOSE UR STRIP-MCNC: NEGATIVE MG/DL
HBA1C MFR BLD: 9.51 % (ref 4.8–5.6)
HCT VFR BLD AUTO: 45.5 % (ref 37.5–51)
HDLC SERPL-MCNC: 38 MG/DL (ref 40–59)
HGB BLD-MCNC: 16.2 G/DL (ref 13–17.7)
HGB UR QL STRIP.AUTO: NEGATIVE
HYALINE CASTS UR QL AUTO: ABNORMAL /LPF
KETONES UR QL STRIP: NEGATIVE
LDLC SERPL CALC-MCNC: 77 MG/DL
LDLC/HDLC SERPL: 2.04 {RATIO} (ref 0–3.55)
LEUKOCYTE ESTERASE UR QL STRIP.AUTO: NEGATIVE
LYMPHOCYTES # BLD AUTO: 2.12 10*3/MM3 (ref 0.7–3.1)
LYMPHOCYTES NFR BLD AUTO: 22.9 % (ref 19.6–45.3)
MCH RBC QN AUTO: 31.4 PG (ref 26.6–33)
MCHC RBC AUTO-ENTMCNC: 35.6 G/DL (ref 31.5–35.7)
MCV RBC AUTO: 88.2 FL (ref 79–97)
MONOCYTES # BLD AUTO: 1.08 10*3/MM3 (ref 0.1–0.9)
MONOCYTES NFR BLD AUTO: 11.7 % (ref 5–12)
MUCOUS THREADS URNS QL MICRO: ABNORMAL /HPF
NEUTROPHILS NFR BLD AUTO: 5.74 10*3/MM3 (ref 1.7–7)
NEUTROPHILS NFR BLD AUTO: 62.1 % (ref 42.7–76)
NITRITE UR QL STRIP: NEGATIVE
PH UR STRIP.AUTO: 5.5 [PH] (ref 5.5–8)
PLATELET # BLD AUTO: 251 10*3/MM3 (ref 140–450)
PMV BLD AUTO: 9.7 FL (ref 6–12)
POTASSIUM SERPL-SCNC: 3.6 MMOL/L (ref 3.4–5)
PROT SERPL-MCNC: 7.9 G/DL (ref 6.3–8.6)
PROT UR QL STRIP: ABNORMAL
RBC # BLD AUTO: 5.16 10*6/MM3 (ref 4.14–5.8)
RBC # UR: ABNORMAL /HPF
REF LAB TEST METHOD: ABNORMAL
SODIUM SERPL-SCNC: 137 MMOL/L (ref 137–145)
SP GR UR STRIP: >=1.03 (ref 1–1.03)
SQUAMOUS #/AREA URNS HPF: ABNORMAL /HPF
T3FREE SERPL-MCNC: 3.54 PG/ML (ref 2–4.4)
T4 SERPL-MCNC: 8.82 MCG/DL (ref 4.5–11.7)
TRIGL SERPL-MCNC: 133 MG/DL
TSH SERPL DL<=0.05 MIU/L-ACNC: 2.08 UIU/ML (ref 0.27–4.2)
UROBILINOGEN UR QL STRIP: ABNORMAL
VLDLC SERPL-MCNC: 26.6 MG/DL
WBC # BLD AUTO: 9.25 10*3/MM3 (ref 3.4–10.8)
WBC UR QL AUTO: ABNORMAL /HPF

## 2020-08-18 PROCEDURE — 85025 COMPLETE CBC W/AUTO DIFF WBC: CPT | Performed by: NURSE PRACTITIONER

## 2020-08-18 PROCEDURE — 80061 LIPID PANEL: CPT | Performed by: NURSE PRACTITIONER

## 2020-08-18 PROCEDURE — 84436 ASSAY OF TOTAL THYROXINE: CPT | Performed by: NURSE PRACTITIONER

## 2020-08-18 PROCEDURE — 81001 URINALYSIS AUTO W/SCOPE: CPT | Performed by: NURSE PRACTITIONER

## 2020-08-18 PROCEDURE — 84443 ASSAY THYROID STIM HORMONE: CPT | Performed by: NURSE PRACTITIONER

## 2020-08-18 PROCEDURE — 36415 COLL VENOUS BLD VENIPUNCTURE: CPT | Performed by: NURSE PRACTITIONER

## 2020-08-18 PROCEDURE — 84481 FREE ASSAY (FT-3): CPT | Performed by: NURSE PRACTITIONER

## 2020-08-18 PROCEDURE — 80053 COMPREHEN METABOLIC PANEL: CPT | Performed by: NURSE PRACTITIONER

## 2020-08-18 PROCEDURE — 83036 HEMOGLOBIN GLYCOSYLATED A1C: CPT | Performed by: NURSE PRACTITIONER

## 2020-08-24 ENCOUNTER — OFFICE VISIT (OUTPATIENT)
Dept: FAMILY MEDICINE CLINIC | Facility: CLINIC | Age: 55
End: 2020-08-24

## 2020-08-24 DIAGNOSIS — E78.2 MIXED HYPERLIPIDEMIA: Chronic | ICD-10-CM

## 2020-08-24 DIAGNOSIS — E11.43 TYPE 2 DIABETES MELLITUS WITH DIABETIC AUTONOMIC NEUROPATHY, WITH LONG-TERM CURRENT USE OF INSULIN (HCC): Chronic | ICD-10-CM

## 2020-08-24 DIAGNOSIS — I10 ESSENTIAL HYPERTENSION: Chronic | ICD-10-CM

## 2020-08-24 DIAGNOSIS — R73.9 HYPERGLYCEMIA: ICD-10-CM

## 2020-08-24 DIAGNOSIS — E11.00 TYPE 2 DIABETES MELLITUS WITH HYPEROSMOLARITY WITHOUT COMA, WITHOUT LONG-TERM CURRENT USE OF INSULIN (HCC): Primary | Chronic | ICD-10-CM

## 2020-08-24 DIAGNOSIS — Z79.4 TYPE 2 DIABETES MELLITUS WITH DIABETIC AUTONOMIC NEUROPATHY, WITH LONG-TERM CURRENT USE OF INSULIN (HCC): Chronic | ICD-10-CM

## 2020-08-24 PROCEDURE — 99443 PR PHYS/QHP TELEPHONE EVALUATION 21-30 MIN: CPT | Performed by: NURSE PRACTITIONER

## 2020-08-24 NOTE — PROGRESS NOTES
"Subjective   Sharan Gomez is a 54 y.o. male. Patient here today with complaints of Follow-up  pt here today for teleheatlh visit for recheck of diabetes, hyperlipidemia, reports has not been eating a good diabetic diet or exercising, no longer working nights he states \" which may help\". He reports that he has not been taking metformin due to GI upset, states every time he takes it he vomits. Is however still taking amaryl and trulicity for diabetes. States weight 199. Reports he is working new job at the [employer], days, was working nights for the [employer].     There were no vitals filed for this visit.  There is no height or weight on file to calculate BMI.  Past Medical History:   Diagnosis Date   • Elevated blood pressure    • Encounter for general adult medical examination without abnormal findings    • Essential hypertension    • Hyperlipidemia    • Kidney stone    • Left flank pain    • Low back pain    • Muscle strain    • Type 2 diabetes mellitus (CMS/[facility])      Diabetes   He presents for his follow-up diabetic visit. He has type 2 diabetes mellitus. His disease course has been worsening. There are no hypoglycemic associated symptoms. Pertinent negatives for hypoglycemia include no headaches. There are no diabetic associated symptoms. Pertinent negatives for diabetes include no chest pain. There are no hypoglycemic complications. Symptoms are stable. Risk factors for coronary artery disease include hypertension, male sex, obesity, dyslipidemia, diabetes mellitus, stress and sedentary lifestyle. Current diabetic treatment includes oral agent (monotherapy) and diet (trulicity). His weight is decreasing steadily. He is following a generally unhealthy diet. His home blood glucose trend is increasing steadily. His breakfast blood glucose range is generally >200 mg/dl. An ACE inhibitor/angiotensin II receptor blocker is being taken.   Hyperlipidemia   This is a chronic problem. The current episode " started more than 1 year ago. Exacerbating diseases include diabetes and obesity. Factors aggravating his hyperlipidemia include fatty foods. Pertinent negatives include no chest pain, myalgias or shortness of breath. Current antihyperlipidemic treatment includes statins, exercise and diet change. Compliance problems include adherence to exercise, adherence to diet and psychosocial issues.  Risk factors for coronary artery disease include stress, obesity, male sex, hypertension, a sedentary lifestyle, dyslipidemia and diabetes mellitus.   Hypertension   This is a chronic problem. The current episode started more than 1 year ago. Pertinent negatives include no chest pain, headaches or shortness of breath. Risk factors for coronary artery disease include obesity, male gender, stress, sedentary lifestyle, dyslipidemia and diabetes mellitus. Past treatments include lifestyle changes and angiotensin blockers. Current antihypertension treatment includes angiotensin blockers and lifestyle changes. Compliance problems include exercise and diet.         The following portions of the patient's history were reviewed and updated as appropriate: allergies, current medications, past family history, past medical history, past social history, past surgical history and problem list.    Review of Systems   Constitutional: Negative.    HENT: Negative.    Eyes: Negative.    Respiratory: Negative.  Negative for shortness of breath.    Cardiovascular: Negative.  Negative for chest pain.   Gastrointestinal: Negative.    Endocrine: Negative.    Genitourinary: Negative.    Musculoskeletal: Negative.  Negative for myalgias.   Skin: Negative.    Allergic/Immunologic: Negative.    Neurological: Negative.  Negative for headaches.   Hematological: Negative.    Psychiatric/Behavioral: Negative.        Objective   Physical Exam  Deferred, telephone visit   Assessment/Plan   Sharan was seen today for follow-up.    Diagnoses and all orders for this  visit:    Type 2 diabetes mellitus with hyperosmolarity without coma, without long-term current use of insulin (CMS/[facility])  Comments:  uncontrolled, hyperglycemia, a1c 9.51      Type 2 diabetes mellitus with diabetic autonomic neuropathy, with long-term current use of insulin (CMS/[facility])  -     Hemoglobin A1c; Future  -     Comprehensive metabolic panel; Future    Hyperglycemia  -     Hemoglobin A1c; Future  -     Comprehensive metabolic panel; Future    Essential hypertension    Mixed hyperlipidemia    Other orders  -     Empagliflozin 25 MG tablet; Take 25 mg by mouth Daily.    You have chosen to receive care through a telephone visit. Do you consent to use a telephone visit for your medical care today? Yes  This visit has been rescheduled as a phone visit to comply with patient safety concerns in accordance with CDC recommendations. Total time of discussion was 21 minutes.  Continue off of metformin since he cannot tolerate  Start jardiance which is prescribed for him today, follow up in 3 months for recheck, cmp and a1c to be repeated at that time  STRONGLY encouraged him to follow better diabetic diet, exercise, stop drinking mountain dew  No other refills needed at this time  Lab results are discussed with him today, all questions and concerns are addressed with understanding noted  He is aware and is in agreement to this plan

## 2020-09-09 RX ORDER — GLIMEPIRIDE 4 MG/1
4 TABLET ORAL
Qty: 30 TABLET | Refills: 0 | Status: SHIPPED | OUTPATIENT
Start: 2020-09-09 | End: 2020-10-06

## 2020-10-06 RX ORDER — GLIMEPIRIDE 4 MG/1
4 TABLET ORAL
Qty: 30 TABLET | Refills: 0 | Status: SHIPPED | OUTPATIENT
Start: 2020-10-06 | End: 2020-11-12 | Stop reason: SDUPTHER

## 2020-11-02 RX ORDER — ATORVASTATIN CALCIUM 20 MG/1
TABLET, FILM COATED ORAL
Qty: 30 TABLET | Refills: 5 | Status: SHIPPED | OUTPATIENT
Start: 2020-11-02 | End: 2021-05-13

## 2020-11-02 RX ORDER — EMPAGLIFLOZIN 25 MG/1
TABLET, FILM COATED ORAL
Qty: 30 TABLET | Refills: 2 | Status: SHIPPED | OUTPATIENT
Start: 2020-11-02 | End: 2021-02-15

## 2020-11-09 ENCOUNTER — LAB (OUTPATIENT)
Dept: LAB | Facility: OTHER | Age: 55
End: 2020-11-09

## 2020-11-09 ENCOUNTER — OFFICE VISIT (OUTPATIENT)
Dept: FAMILY MEDICINE CLINIC | Facility: CLINIC | Age: 55
End: 2020-11-09

## 2020-11-09 VITALS
HEART RATE: 76 BPM | SYSTOLIC BLOOD PRESSURE: 132 MMHG | DIASTOLIC BLOOD PRESSURE: 70 MMHG | HEIGHT: 66 IN | TEMPERATURE: 96.7 F | BODY MASS INDEX: 32.85 KG/M2 | WEIGHT: 204.4 LBS

## 2020-11-09 DIAGNOSIS — Z00.00 ROUTINE GENERAL MEDICAL EXAMINATION AT A HEALTH CARE FACILITY: ICD-10-CM

## 2020-11-09 DIAGNOSIS — Z79.4 TYPE 2 DIABETES MELLITUS WITH DIABETIC AUTONOMIC NEUROPATHY, WITH LONG-TERM CURRENT USE OF INSULIN (HCC): Chronic | ICD-10-CM

## 2020-11-09 DIAGNOSIS — R73.9 HYPERGLYCEMIA: ICD-10-CM

## 2020-11-09 DIAGNOSIS — Z00.00 ROUTINE GENERAL MEDICAL EXAMINATION AT A HEALTH CARE FACILITY: Primary | ICD-10-CM

## 2020-11-09 DIAGNOSIS — E11.43 TYPE 2 DIABETES MELLITUS WITH DIABETIC AUTONOMIC NEUROPATHY, WITH LONG-TERM CURRENT USE OF INSULIN (HCC): Chronic | ICD-10-CM

## 2020-11-09 LAB
ALBUMIN SERPL-MCNC: 4.4 G/DL (ref 3.5–5)
ALBUMIN/GLOB SERPL: 1.2 G/DL (ref 1.1–1.8)
ALP SERPL-CCNC: 84 U/L (ref 38–126)
ALT SERPL W P-5'-P-CCNC: 38 U/L
ANION GAP SERPL CALCULATED.3IONS-SCNC: 11 MMOL/L (ref 5–15)
AST SERPL-CCNC: 27 U/L (ref 17–59)
BASOPHILS # BLD AUTO: 0.06 10*3/MM3 (ref 0–0.2)
BASOPHILS NFR BLD AUTO: 0.6 % (ref 0–1.5)
BILIRUB SERPL-MCNC: 1.2 MG/DL (ref 0.2–1.3)
BILIRUB UR QL STRIP: NEGATIVE
BUN SERPL-MCNC: 14 MG/DL (ref 7–23)
BUN/CREAT SERPL: 12.2 (ref 7–25)
CALCIUM SPEC-SCNC: 9.1 MG/DL (ref 8.4–10.2)
CHLORIDE SERPL-SCNC: 102 MMOL/L (ref 101–112)
CHOLEST SERPL-MCNC: 145 MG/DL (ref 150–200)
CLARITY UR: CLEAR
CO2 SERPL-SCNC: 24 MMOL/L (ref 22–30)
COLOR UR: YELLOW
CREAT SERPL-MCNC: 1.15 MG/DL (ref 0.7–1.3)
DEPRECATED RDW RBC AUTO: 39.2 FL (ref 37–54)
EOSINOPHIL # BLD AUTO: 0.26 10*3/MM3 (ref 0–0.4)
EOSINOPHIL NFR BLD AUTO: 2.5 % (ref 0.3–6.2)
ERYTHROCYTE [DISTWIDTH] IN BLOOD BY AUTOMATED COUNT: 12.3 % (ref 12.3–15.4)
GFR SERPL CREATININE-BSD FRML MDRD: 67 ML/MIN/1.73 (ref 56–130)
GLOBULIN UR ELPH-MCNC: 3.8 GM/DL (ref 2.3–3.5)
GLUCOSE SERPL-MCNC: 184 MG/DL (ref 70–99)
GLUCOSE UR STRIP-MCNC: ABNORMAL MG/DL
HBA1C MFR BLD: 8.1 % (ref 4.8–5.6)
HCT VFR BLD AUTO: 50.3 % (ref 37.5–51)
HDLC SERPL-MCNC: 41 MG/DL (ref 40–59)
HGB BLD-MCNC: 17.5 G/DL (ref 13–17.7)
HGB UR QL STRIP.AUTO: NEGATIVE
KETONES UR QL STRIP: NEGATIVE
LDLC SERPL CALC-MCNC: 76 MG/DL
LDLC/HDLC SERPL: 1.73 {RATIO} (ref 0–3.55)
LEUKOCYTE ESTERASE UR QL STRIP.AUTO: NEGATIVE
LYMPHOCYTES # BLD AUTO: 2.49 10*3/MM3 (ref 0.7–3.1)
LYMPHOCYTES NFR BLD AUTO: 24.2 % (ref 19.6–45.3)
MCH RBC QN AUTO: 30.5 PG (ref 26.6–33)
MCHC RBC AUTO-ENTMCNC: 34.8 G/DL (ref 31.5–35.7)
MCV RBC AUTO: 87.8 FL (ref 79–97)
MONOCYTES # BLD AUTO: 0.99 10*3/MM3 (ref 0.1–0.9)
MONOCYTES NFR BLD AUTO: 9.6 % (ref 5–12)
NEUTROPHILS NFR BLD AUTO: 6.49 10*3/MM3 (ref 1.7–7)
NEUTROPHILS NFR BLD AUTO: 63.1 % (ref 42.7–76)
NITRITE UR QL STRIP: NEGATIVE
PH UR STRIP.AUTO: 5.5 [PH] (ref 5.5–8)
PLATELET # BLD AUTO: 267 10*3/MM3 (ref 140–450)
PMV BLD AUTO: 9.7 FL (ref 6–12)
POTASSIUM SERPL-SCNC: 4.1 MMOL/L (ref 3.4–5)
PROT SERPL-MCNC: 8.2 G/DL (ref 6.3–8.6)
PROT UR QL STRIP: NEGATIVE
RBC # BLD AUTO: 5.73 10*6/MM3 (ref 4.14–5.8)
SODIUM SERPL-SCNC: 137 MMOL/L (ref 137–145)
SP GR UR STRIP: 1.01 (ref 1–1.03)
TRIGL SERPL-MCNC: 166 MG/DL
UROBILINOGEN UR QL STRIP: ABNORMAL
VLDLC SERPL-MCNC: 28 MG/DL (ref 5–40)
WBC # BLD AUTO: 10.29 10*3/MM3 (ref 3.4–10.8)

## 2020-11-09 PROCEDURE — 80061 LIPID PANEL: CPT | Performed by: NURSE PRACTITIONER

## 2020-11-09 PROCEDURE — 36415 COLL VENOUS BLD VENIPUNCTURE: CPT | Performed by: NURSE PRACTITIONER

## 2020-11-09 PROCEDURE — 99396 PREV VISIT EST AGE 40-64: CPT | Performed by: NURSE PRACTITIONER

## 2020-11-09 PROCEDURE — 80053 COMPREHEN METABOLIC PANEL: CPT | Performed by: NURSE PRACTITIONER

## 2020-11-09 PROCEDURE — 85025 COMPLETE CBC W/AUTO DIFF WBC: CPT | Performed by: NURSE PRACTITIONER

## 2020-11-09 PROCEDURE — 81003 URINALYSIS AUTO W/O SCOPE: CPT | Performed by: NURSE PRACTITIONER

## 2020-11-09 PROCEDURE — 83036 HEMOGLOBIN GLYCOSYLATED A1C: CPT | Performed by: NURSE PRACTITIONER

## 2020-11-09 NOTE — PROGRESS NOTES
"Subjective   Sharan Gomez is a 55 y.o. male. Patient here today with complaints of Annual Exam  Patient is here today for an annual physical for his work insurance. He has been fasting today and will have his lab work completed as requested for his work. He has no complaints or concerns today.    Vitals:    11/09/20 0803   BP: 132/70   Pulse: 76   Temp: 96.7 °F (35.9 °C)   Weight: 92.7 kg (204 lb 6.4 oz)   Height: 167.6 cm (66\")   PainSc: 0-No pain     Body mass index is 32.99 kg/m².  Past Medical History:   Diagnosis Date   • Elevated blood pressure    • Encounter for general adult medical examination without abnormal findings    • Essential hypertension    • Hyperlipidemia    • Kidney stone    • Left flank pain    • Low back pain    • Muscle strain    • Type 2 diabetes mellitus (CMS/[facility])      History of Present Illness     The following portions of the patient's history were reviewed and updated as appropriate: allergies, current medications, past family history, past medical history, past social history, past surgical history and problem list.    Review of Systems   Constitutional: Negative.    HENT: Negative.    Eyes: Negative.    Respiratory: Negative.    Cardiovascular: Negative.    Gastrointestinal: Negative.    Endocrine: Negative.    Genitourinary: Negative.    Musculoskeletal: Negative.    Skin: Negative.    Allergic/Immunologic: Negative.    Neurological: Negative.    Hematological: Negative.    Psychiatric/Behavioral: Negative.        Objective   Physical Exam  Vitals signs and nursing note reviewed.   Constitutional:       General: He is not in acute distress.     Appearance: He is well-developed. He is obese. He is not ill-appearing, toxic-appearing or diaphoretic.   HENT:      Head: Normocephalic and atraumatic.      Right Ear: Tympanic membrane, ear canal and external ear normal. There is no impacted cerumen.      Left Ear: Tympanic membrane, ear canal and external ear normal. There is no " impacted cerumen.      Nose: Nose normal. No congestion or rhinorrhea.      Mouth/Throat:      Mouth: Mucous membranes are moist.      Pharynx: Oropharynx is clear. No oropharyngeal exudate or posterior oropharyngeal erythema.   Eyes:      General: No scleral icterus.        Right eye: No discharge.         Left eye: No discharge.      Conjunctiva/sclera: Conjunctivae normal.      Pupils: Pupils are equal, round, and reactive to light.   Neck:      Musculoskeletal: Normal range of motion and neck supple. No neck rigidity or muscular tenderness.      Thyroid: No thyromegaly.      Vascular: No carotid bruit or JVD.      Trachea: No tracheal deviation.   Cardiovascular:      Rate and Rhythm: Normal rate and regular rhythm.      Heart sounds: Normal heart sounds. No murmur. No friction rub. No gallop.    Pulmonary:      Effort: Pulmonary effort is normal. No respiratory distress.      Breath sounds: Normal breath sounds. No stridor. No wheezing, rhonchi or rales.   Chest:      Chest wall: No tenderness.   Abdominal:      General: Bowel sounds are normal. There is no distension.      Palpations: Abdomen is soft. There is no mass.      Tenderness: There is no abdominal tenderness. There is no guarding or rebound.      Hernia: No hernia is present.   Musculoskeletal: Normal range of motion.         General: No tenderness or deformity.      Right lower leg: No edema.      Left lower leg: No edema.   Lymphadenopathy:      Cervical: No cervical adenopathy.   Skin:     General: Skin is warm and dry.      Capillary Refill: Capillary refill takes less than 2 seconds.      Coloration: Skin is not jaundiced or pale.      Findings: No bruising, erythema, lesion or rash.   Neurological:      Mental Status: He is alert and oriented to person, place, and time.      Cranial Nerves: No cranial nerve deficit.      Motor: No abnormal muscle tone.      Coordination: Coordination normal.      Deep Tendon Reflexes: Reflexes are normal and  symmetric. Reflexes normal.   Psychiatric:         Mood and Affect: Mood normal.         Behavior: Behavior normal.         Thought Content: Thought content normal.         Judgment: Judgment normal.         Assessment/Plan   Diagnoses and all orders for this visit:    1. Routine general medical examination at a health care facility (Primary)  -     CBC & Differential; Future  -     Comprehensive metabolic panel; Future  -     Lipid panel; Future  -     Urinalysis With Culture If Indicated -; Future  -     Hemoglobin A1c; Future    Lab work as outlined, pt will have this drawn today  Once lab work is resulted we will complete insurance forms, copy and scan into chart   Follow up as scheduled   All questions and concerns are addressed with understanding noted.   The patient is in agreement to above plan.

## 2020-11-12 RX ORDER — GLIMEPIRIDE 4 MG/1
4 TABLET ORAL
Qty: 30 TABLET | Refills: 5 | Status: SHIPPED | OUTPATIENT
Start: 2020-11-12 | End: 2021-01-06 | Stop reason: SDUPTHER

## 2020-11-12 RX ORDER — DULAGLUTIDE 3 MG/.5ML
3 INJECTION, SOLUTION SUBCUTANEOUS WEEKLY
Qty: 4 PEN | Refills: 4 | Status: SHIPPED | OUTPATIENT
Start: 2020-11-12 | End: 2021-09-07

## 2020-12-07 RX ORDER — LOSARTAN POTASSIUM 50 MG/1
TABLET ORAL
Qty: 30 TABLET | Refills: 5 | Status: SHIPPED | OUTPATIENT
Start: 2020-12-07 | End: 2021-06-14

## 2021-01-06 RX ORDER — GLIMEPIRIDE 4 MG/1
4 TABLET ORAL
Qty: 30 TABLET | Refills: 0 | Status: SHIPPED | OUTPATIENT
Start: 2021-01-06 | End: 2021-06-15

## 2021-01-08 ENCOUNTER — OFFICE VISIT (OUTPATIENT)
Dept: FAMILY MEDICINE CLINIC | Facility: CLINIC | Age: 56
End: 2021-01-08

## 2021-01-08 DIAGNOSIS — U07.1 COVID-19: Primary | ICD-10-CM

## 2021-01-08 PROCEDURE — 99441 PR PHYS/QHP TELEPHONE EVALUATION 5-10 MIN: CPT | Performed by: NURSE PRACTITIONER

## 2021-01-08 RX ORDER — DEXTROMETHORPHAN HYDROBROMIDE AND PROMETHAZINE HYDROCHLORIDE 15; 6.25 MG/5ML; MG/5ML
5 SYRUP ORAL 4 TIMES DAILY PRN
Qty: 200 ML | Refills: 0 | Status: SHIPPED | OUTPATIENT
Start: 2021-01-08 | End: 2021-01-18

## 2021-01-08 RX ORDER — GLUCOSAMINE HCL 500 MG
100 TABLET ORAL DAILY
COMMUNITY
End: 2022-06-30 | Stop reason: SDDI

## 2021-01-08 RX ORDER — PREDNISONE 10 MG/1
TABLET ORAL
Qty: 21 EACH | Refills: 0 | Status: SHIPPED | OUTPATIENT
Start: 2021-01-08 | End: 2021-01-18

## 2021-01-08 NOTE — PATIENT INSTRUCTIONS
COVID-19  COVID-19 is a respiratory infection that is caused by a virus called severe acute respiratory syndrome coronavirus 2 (SARS-CoV-2). The disease is also known as coronavirus disease or novel coronavirus. In some people, the virus may not cause any symptoms. In others, it may cause a serious infection. The infection can get worse quickly and can lead to complications, such as:  · Pneumonia, or infection of the lungs.  · Acute respiratory distress syndrome or ARDS. This is a condition in which fluid build-up in the lungs prevents the lungs from filling with air and passing oxygen into the blood.  · Acute respiratory failure. This is a condition in which there is not enough oxygen passing from the lungs to the body or when carbon dioxide is not passing from the lungs out of the body.  · Sepsis or septic shock. This is a serious bodily reaction to an infection.  · Blood clotting problems.  · Secondary infections due to bacteria or fungus.  · Organ failure. This is when your body's organs stop working.  The virus that causes COVID-19 is contagious. This means that it can spread from person to person through droplets from coughs and sneezes (respiratory secretions).  What are the causes?  This illness is caused by a virus. You may catch the virus by:  · Breathing in droplets from an infected person. Droplets can be spread by a person breathing, speaking, singing, coughing, or sneezing.  · Touching something, like a table or a doorknob, that was exposed to the virus (contaminated) and then touching your mouth, nose, or eyes.  What increases the risk?  Risk for infection  You are more likely to be infected with this virus if you:  · Are within 6 feet (2 meters) of a person with COVID-19.  · Provide care for or live with a person who is infected with COVID-19.  · Spend time in crowded indoor spaces or live in shared housing.  Risk for serious illness  You are more likely to become seriously ill from the virus if  "you:  · Are 50 years of age or older. The higher your age, the more you are at risk for serious illness.  · Live in a nursing home or long-term care facility.  · Have cancer.  · Have a long-term (chronic) disease such as:  ? Chronic lung disease, including chronic obstructive pulmonary disease or asthma.  ? A long-term disease that lowers your body's ability to fight infection (immunocompromised).  ? Heart disease, including heart failure, a condition in which the arteries that lead to the heart become narrow or blocked (coronary artery disease), a disease which makes the heart muscle thick, weak, or stiff (cardiomyopathy).  ? Diabetes.  ? Chronic kidney disease.  ? Sickle cell disease, a condition in which red blood cells have an abnormal \"sickle\" shape.  ? Liver disease.  · Are obese.  What are the signs or symptoms?  Symptoms of this condition can range from mild to severe. Symptoms may appear any time from 2 to 14 days after being exposed to the virus. They include:  · A fever or chills.  · A cough.  · Difficulty breathing.  · Headaches, body aches, or muscle aches.  · Runny or stuffy (congested) nose.  · A sore throat.  · New loss of taste or smell.  Some people may also have stomach problems, such as nausea, vomiting, or diarrhea.  Other people may not have any symptoms of COVID-19.  How is this diagnosed?  This condition may be diagnosed based on:  · Your signs and symptoms, especially if:  ? You live in an area with a COVID-19 outbreak.  ? You recently traveled to or from an area where the virus is common.  ? You provide care for or live with a person who was diagnosed with COVID-19.  ? You were exposed to a person who was diagnosed with COVID-19.  · A physical exam.  · Lab tests, which may include:  ? Taking a sample of fluid from the back of your nose and throat (nasopharyngeal fluid), your nose, or your throat using a swab.  ? A sample of mucus from your lungs (sputum).  ? Blood tests.  · Imaging tests, " which may include, X-rays, CT scan, or ultrasound.  How is this treated?  At present, there is no medicine to treat COVID-19. Medicines that treat other diseases are being used on a trial basis to see if they are effective against COVID-19.  Your health care provider will talk with you about ways to treat your symptoms. For most people, the infection is mild and can be managed at home with rest, fluids, and over-the-counter medicines.  Treatment for a serious infection usually takes places in a hospital intensive care unit (ICU). It may include one or more of the following treatments. These treatments are given until your symptoms improve.  · Receiving fluids and medicines through an IV.  · Supplemental oxygen. Extra oxygen is given through a tube in the nose, a face mask, or a paige.  · Positioning you to lie on your stomach (prone position). This makes it easier for oxygen to get into the lungs.  · Continuous positive airway pressure (CPAP) or bi-level positive airway pressure (BPAP) machine. This treatment uses mild air pressure to keep the airways open. A tube that is connected to a motor delivers oxygen to the body.  · Ventilator. This treatment moves air into and out of the lungs by using a tube that is placed in your windpipe.  · Tracheostomy. This is a procedure to create a hole in the neck so that a breathing tube can be inserted.  · Extracorporeal membrane oxygenation (ECMO). This procedure gives the lungs a chance to recover by taking over the functions of the heart and lungs. It supplies oxygen to the body and removes carbon dioxide.  Follow these instructions at home:  Lifestyle  · If you are sick, stay home except to get medical care. Your health care provider will tell you how long to stay home. Call your health care provider before you go for medical care.  · Rest at home as told by your health care provider.  · Do not use any products that contain nicotine or tobacco, such as cigarettes,  e-cigarettes, and chewing tobacco. If you need help quitting, ask your health care provider.  · Return to your normal activities as told by your health care provider. Ask your health care provider what activities are safe for you.  General instructions  · Take over-the-counter and prescription medicines only as told by your health care provider.  · Drink enough fluid to keep your urine pale yellow.  · Keep all follow-up visits as told by your health care provider. This is important.  How is this prevented?    There is no vaccine to help prevent COVID-19 infection. However, there are steps you can take to protect yourself and others from this virus.  To protect yourself:   · Do not travel to areas where COVID-19 is a risk. The areas where COVID-19 is reported change often. To identify high-risk areas and travel restrictions, check the CDC travel website: wwwnc.cdc.gov/travel/notices  · If you live in, or must travel to, an area where COVID-19 is a risk, take precautions to avoid infection.  ? Stay away from people who are sick.  ? Wash your hands often with soap and water for 20 seconds. If soap and water are not available, use an alcohol-based hand .  ? Avoid touching your mouth, face, eyes, or nose.  ? Avoid going out in public, follow guidance from your state and local health authorities.  ? If you must go out in public, wear a cloth face covering or face mask. Make sure your mask covers your nose and mouth.  ? Avoid crowded indoor spaces. Stay at least 6 feet (2 meters) away from others.  ? Disinfect objects and surfaces that are frequently touched every day. This may include:  § Counters and tables.  § Doorknobs and light switches.  § Sinks and faucets.  § Electronics, such as phones, remote controls, keyboards, computers, and tablets.  To protect others:  If you have symptoms of COVID-19, take steps to prevent the virus from spreading to others.  · If you think you have a COVID-19 infection, contact  your health care provider right away. Tell your health care team that you think you may have a COVID-19 infection.  · Stay home. Leave your house only to seek medical care. Do not use public transport.  · Do not travel while you are sick.  · Wash your hands often with soap and water for 20 seconds. If soap and water are not available, use alcohol-based hand .  · Stay away from other members of your household. Let healthy household members care for children and pets, if possible. If you have to care for children or pets, wash your hands often and wear a mask. If possible, stay in your own room, separate from others. Use a different bathroom.  · Make sure that all people in your household wash their hands well and often.  · Cough or sneeze into a tissue or your sleeve or elbow. Do not cough or sneeze into your hand or into the air.  · Wear a cloth face covering or face mask. Make sure your mask covers your nose and mouth.  Where to find more information  · Centers for Disease Control and Prevention: www.cdc.gov/coronavirus/2019-ncov/index.html  · World Health Organization: www.who.int/health-topics/coronavirus  Contact a health care provider if:  · You live in or have traveled to an area where COVID-19 is a risk and you have symptoms of the infection.  · You have had contact with someone who has COVID-19 and you have symptoms of the infection.  Get help right away if:  · You have trouble breathing.  · You have pain or pressure in your chest.  · You have confusion.  · You have bluish lips and fingernails.  · You have difficulty waking from sleep.  · You have symptoms that get worse.  These symptoms may represent a serious problem that is an emergency. Do not wait to see if the symptoms will go away. Get medical help right away. Call your local emergency services (911 in the [place] Do not drive yourself to the hospital. Let the emergency medical personnel know if you think you have  COVID-19.  Summary  · COVID-19 is a respiratory infection that is caused by a virus. It is also known as coronavirus disease or novel coronavirus. It can cause serious infections, such as pneumonia, acute respiratory distress syndrome, acute respiratory failure, or sepsis.  · The virus that causes COVID-19 is contagious. This means that it can spread from person to person through droplets from breathing, speaking, singing, coughing, or sneezing.  · You are more likely to develop a serious illness if you are 50 years of age or older, have a weak immune system, live in a nursing home, or have chronic disease.  · There is no medicine to treat COVID-19. Your health care provider will talk with you about ways to treat your symptoms.  · Take steps to protect yourself and others from infection. Wash your hands often and disinfect objects and surfaces that are frequently touched every day. Stay away from people who are sick and wear a mask if you are sick.  This information is not intended to replace advice given to you by your health care provider. Make sure you discuss any questions you have with your health care provider.  Document Revised: 10/16/2020 Document Reviewed: 01/23/2020  [employer] Patient Education © 2020 [employer]

## 2021-01-08 NOTE — PROGRESS NOTES
CC: Cough (worsening ) and Vomiting      Subjective:  Sharan Gomez is a 55 y.o. male who presents for     Was diagnosed with COVID on 6 days ago. Was feeling better on yesterday, but feeling worse today. Has associated cough, low grade fever, and nausea, and vomiting x 1 episode. Cough is non-productive. No noted aggravating or alleviating factors Taking OTC cough and cold medication without resolution of the cough. PCP is BRYANT Looney      The following portions of the patient's history were reviewed and updated as appropriate: allergies, current medications, past family history, past medical history, past social history, past surgical history and problem list.    Past Medical History:   Diagnosis Date   • Elevated blood pressure    • Encounter for general adult medical examination without abnormal findings    • Essential hypertension    • Hyperlipidemia    • Kidney stone    • Left flank pain    • Low back pain    • Muscle strain    • Type 2 diabetes mellitus (CMS/HCC)          Current Outpatient Medications:   •  atorvastatin (LIPITOR) 20 MG tablet, TAKE ONE TABLET BY MOUTH EVERY DAY, Disp: 30 tablet, Rfl: 5  •  Blood Glucose Monitoring Suppl (ACURA BLOOD GLUCOSE METER) W/DEVICE kit, Use once a day, Disp: , Rfl:   •  Coenzyme Q10 100 MG tablet, Take 100 mg by mouth Daily., Disp: , Rfl:   •  Dulaglutide (Trulicity) 3 MG/0.5ML solution pen-injector, Inject 3 mg under the skin into the appropriate area as directed 1 (One) Time Per Week., Disp: 4 pen, Rfl: 4  •  glimepiride (AMARYL) 4 MG tablet, TAKE 1 TABLET BY MOUTH EVERY MORNING BEFORE BREAKFAST, Disp: 30 tablet, Rfl: 0  •  glucose blood test strip, 1 each by Other route. Testing twice a day, Disp: , Rfl:   •  Jardiance 25 MG tablet, TAKE 1 TABLET BY MOUTH EVERY DAY, Disp: 30 tablet, Rfl: 2  •  Lancets misc, Once a day, Disp: , Rfl:   •  losartan (COZAAR) 50 MG tablet, TAKE ONE TABLET BY MOUTH EVERY DAY, Disp: 30 tablet, Rfl: 5  •  predniSONE (DELTASONE)  10 MG (48) dose pack, Take as directed, Disp: 21 each, Rfl: 0  •  promethazine-dextromethorphan (PROMETHAZINE-DM) 6.25-15 MG/5ML syrup, Take 5 mL by mouth 4 (Four) Times a Day As Needed for Cough., Disp: 200 mL, Rfl: 0    Review of Systems    Review of Systems   Constitutional: Positive for fatigue and fever.   HENT: Negative.    Eyes: Negative.    Respiratory: Positive for cough.    Gastrointestinal: Positive for nausea and vomiting.   Endocrine: Negative.    Genitourinary: Negative.    Musculoskeletal: Negative.    Skin: Negative.    Allergic/Immunologic: Negative.    Neurological: Negative.    Hematological: Negative.    Psychiatric/Behavioral: Negative.    All other systems reviewed and are negative.      Objective  There were no vitals filed for this visit.  There is no height or weight on file to calculate BMI.    Physical Exam    Physical Exam  Deferred, Telephone visit because of pandemic       Diagnoses and all orders for this visit:    1. COVID-19 (Primary)  -     predniSONE (DELTASONE) 10 MG (48) dose pack; Take as directed  Dispense: 21 each; Refill: 0  -     promethazine-dextromethorphan (PROMETHAZINE-DM) 6.25-15 MG/5ML syrup; Take 5 mL by mouth 4 (Four) Times a Day As Needed for Cough.  Dispense: 200 mL; Refill: 0       Will treat symptoms with Prednisone and Promethazine DM as above.   Instructed on how to take medications and possible side effects of each.  Advised to monitor sugar closely as prednisone can cause a rise in BS.  Advised to increase fluids.  Advised Tylenol or IBPF PRN.  To follow up with PCP BRYANT Looney PRN  If acute worsening in symptoms, to go to ER.  Answered all questions.  Pt verbalized understanding of plan of care.      This document has been electronically signed by BRYANT Jauregui on January 8, 2021 11:39 CST     You have chosen to receive care through a telephone visit. Do you consent to use a telephone visit for your medical care today? Yes  This visit has been  rescheduled as a phone visit to comply with patient safety concerns in accordance with CDC recommendations. Total time of discussion was 9 minutes.

## 2021-01-18 ENCOUNTER — OFFICE VISIT (OUTPATIENT)
Dept: FAMILY MEDICINE CLINIC | Facility: CLINIC | Age: 56
End: 2021-01-18

## 2021-01-18 DIAGNOSIS — J12.82 PNEUMONIA DUE TO COVID-19 VIRUS: Primary | ICD-10-CM

## 2021-01-18 DIAGNOSIS — U07.1 PNEUMONIA DUE TO COVID-19 VIRUS: Primary | ICD-10-CM

## 2021-01-18 PROCEDURE — 99443 PR PHYS/QHP TELEPHONE EVALUATION 21-30 MIN: CPT | Performed by: NURSE PRACTITIONER

## 2021-01-18 RX ORDER — ALBUTEROL SULFATE 1.25 MG/3ML
1 SOLUTION RESPIRATORY (INHALATION) EVERY 6 HOURS PRN
Qty: 1 ML | Refills: 2 | Status: SHIPPED | OUTPATIENT
Start: 2021-01-18 | End: 2021-02-01 | Stop reason: SDUPTHER

## 2021-01-18 RX ORDER — PREDNISONE 20 MG/1
20 TABLET ORAL 2 TIMES DAILY
Qty: 10 TABLET | Refills: 0 | Status: SHIPPED | OUTPATIENT
Start: 2021-01-18 | End: 2021-01-23

## 2021-01-18 RX ORDER — DOXYCYCLINE HYCLATE 100 MG/1
100 TABLET, DELAYED RELEASE ORAL 2 TIMES DAILY
Qty: 20 TABLET | Refills: 0 | Status: SHIPPED | OUTPATIENT
Start: 2021-01-18 | End: 2021-01-28

## 2021-01-18 NOTE — PROGRESS NOTES
Subjective   Sharan Gomez is a 55 y.o. male. Patient here today with complaints of Cough and Nasal Congestion  Patient here today for telehealth visit with wife stating that he was positive for Covid and flu B on 1/2/2021, never really felt any better, he had telephone visit with She Rivera and was prescribed Phenergan with codeine cough syrup and Deltasone pack, says that his complaints persisted he felt worse he went to the emergency room and was given a Z-Alexander which he finished 2 days ago and some other cough medication, still having temp but says that he is some better than he was when he went to the hospital, at that point reports pulse ox at home running 89 to 91% on room air, temp is decreased to  now and pulse ox running 90 to 95% at home now, still having nonproductive cough, using cough medicine which she has been prescribed without much relief of symptoms, he does not smoke but does have diabetes hypertension hyperlipidemia obesity.  Says he is just not really getting significantly better.  Records from office visit and ER are reviewed including chest x-ray results    There were no vitals filed for this visit.  There is no height or weight on file to calculate BMI.  Past Medical History:   Diagnosis Date   • Elevated blood pressure    • Encounter for general adult medical examination without abnormal findings    • Essential hypertension    • Hyperlipidemia    • Kidney stone    • Left flank pain    • Low back pain    • Muscle strain    • Type 2 diabetes mellitus (CMS/HCC)      Cough  This is a new problem. The current episode started 1 to 4 weeks ago. The problem has been gradually worsening. The problem occurs every few minutes. The cough is non-productive. Associated symptoms include ear congestion, a fever, myalgias, nasal congestion, postnasal drip and shortness of breath. He has tried rest, prescription cough suppressant and oral steroids for the symptoms. The treatment provided mild  relief. His past medical history is significant for pneumonia.   Pneumonia  He complains of cough and shortness of breath. There is no sputum production. This is a new problem. The current episode started 1 to 4 weeks ago. The problem occurs constantly. The problem has been unchanged. The cough is non-productive. Associated symptoms include ear congestion, a fever, myalgias, nasal congestion and postnasal drip. His symptoms are aggravated by any activity. His symptoms are alleviated by oral steroids, rest and prescription cough suppressant. He reports minimal improvement on treatment. His past medical history is significant for pneumonia. Past medical history comments: Flu B +, covid 19 +  .        The following portions of the patient's history were reviewed and updated as appropriate: allergies, current medications, past family history, past medical history, past social history, past surgical history and problem list.    Review of Systems   Constitutional: Positive for fever.   HENT: Positive for postnasal drip.    Eyes: Negative.    Respiratory: Positive for cough and shortness of breath. Negative for sputum production.    Cardiovascular: Negative.    Gastrointestinal: Negative.    Endocrine: Negative.    Genitourinary: Negative.    Musculoskeletal: Positive for myalgias.   Skin: Negative.    Allergic/Immunologic: Negative.    Neurological: Negative.    Hematological: Negative.    Psychiatric/Behavioral: Negative.        Objective   Physical Exam  Deferred, telephone visit   Assessment/Plan   Diagnoses and all orders for this visit:    1. Pneumonia due to COVID-19 virus (Primary)  -     doxycycline (DORYX) 100 MG enteric coated tablet; Take 1 tablet by mouth 2 (Two) Times a Day for 10 days.  Dispense: 20 tablet; Refill: 0  -     albuterol (ACCUNEB) 1.25 MG/3ML nebulizer solution; Take 3 mL by nebulization Every 6 (Six) Hours As Needed for Wheezing.  Dispense: 1 mL; Refill: 2  -     Home Nebulizer  -      predniSONE (DELTASONE) 20 MG tablet; Take 1 tablet by mouth 2 (Two) Times a Day for 5 days.  Dispense: 10 tablet; Refill: 0    You have chosen to receive care through a telephone visit. Do you consent to use a telephone visit for your medical care today? Yes  This visit has been rescheduled as a phone visit to comply with patient safety concerns in accordance with CDC recommendations. Total time of discussion was 23 minutes.  Records from previous office visit and ER visits reviewed including cxr in ER which revealed opacities possibly from covid pneumonia  They are made aware of these results  Will treat with levaquin, po prednisone and albuterol prn per neb with prescription for nebulizer given to him as well  I strongly advised wife to take pt to ER should any worsening in symptoms occur  She is advised to monitor his BP, pulse and pulse closely at home   Advised to push fluids, rest, use ibuprofen or tylenol prn fever or pain , etc  All questions and concerns are addressed with understanding noted  He and wife are aware and is in agreement to this plan

## 2021-01-25 ENCOUNTER — OFFICE VISIT (OUTPATIENT)
Dept: FAMILY MEDICINE CLINIC | Facility: CLINIC | Age: 56
End: 2021-01-25

## 2021-01-25 DIAGNOSIS — J12.82 PNEUMONIA DUE TO COVID-19 VIRUS: Primary | ICD-10-CM

## 2021-01-25 DIAGNOSIS — U07.1 PNEUMONIA DUE TO COVID-19 VIRUS: Primary | ICD-10-CM

## 2021-01-25 PROCEDURE — 99443 PR PHYS/QHP TELEPHONE EVALUATION 21-30 MIN: CPT | Performed by: NURSE PRACTITIONER

## 2021-01-25 RX ORDER — PROMETHAZINE HYDROCHLORIDE AND CODEINE PHOSPHATE 6.25; 1 MG/5ML; MG/5ML
5 SYRUP ORAL EVERY 4 HOURS PRN
Qty: 240 ML | Refills: 0 | Status: SHIPPED | OUTPATIENT
Start: 2021-01-25 | End: 2021-09-16

## 2021-01-25 RX ORDER — BUDESONIDE AND FORMOTEROL FUMARATE DIHYDRATE 80; 4.5 UG/1; UG/1
2 AEROSOL RESPIRATORY (INHALATION)
Qty: 1 INHALER | Refills: 0 | Status: SHIPPED | OUTPATIENT
Start: 2021-01-25 | End: 2021-02-18

## 2021-01-27 NOTE — PROGRESS NOTES
"Subjective   Sharan Gomez is a 55 y.o. male. Patient here today with complaints of Follow-up and Fatigue  pt here today for complaints still of cough, chest congestion, he was dx with covid and developed covid pneumonia , was also flu B pos several weeks ago, was treated with antibiotics, steroids, was seen in [facility] ER but not admitted, has been monitoring pulse ox on RA and reports has been running 95-96% since last visit here. States he is now out of quarantine but does not feel like he is able to go back to work yet, states he cannot quit coughing. He feels \"worn out\" although he does feel some better than he did. Cough sl productive but clear in color. Reports wheezing. Has completed po steroids but has 5 more pills left of antibiotics. Supposed to go back to work tomorrow, does not feel able to do so.     There were no vitals filed for this visit.  There is no height or weight on file to calculate BMI.  Past Medical History:   Diagnosis Date   • Elevated blood pressure    • Encounter for general adult medical examination without abnormal findings    • Essential hypertension    • Hyperlipidemia    • Kidney stone    • Left flank pain    • Low back pain    • Muscle strain    • Type 2 diabetes mellitus (CMS/HCC)      Fatigue  This is a new problem. The current episode started 1 to 4 weeks ago. The problem occurs constantly. The problem has been unchanged. Associated symptoms include congestion, coughing and fatigue. Pertinent negatives include no fever. He has tried sleep, rest and relaxation for the symptoms. The treatment provided mild relief.   Pneumonia  He complains of cough, sputum production and wheezing. This is a new problem. The current episode started 1 to 4 weeks ago. The problem occurs constantly. The problem has been gradually improving. The cough is productive. Associated symptoms include malaise/fatigue. Pertinent negatives include no fever. His symptoms are alleviated by rest, beta-agonist " and oral steroids. He reports moderate improvement on treatment.        The following portions of the patient's history were reviewed and updated as appropriate: allergies, current medications, past family history, past medical history, past social history, past surgical history and problem list.    Review of Systems   Constitutional: Positive for fatigue and malaise/fatigue. Negative for fever.   HENT: Positive for congestion.    Respiratory: Positive for cough, sputum production and wheezing.        Objective   Physical Exam  Deferred, telephone visit  Assessment/Plan   Diagnoses and all orders for this visit:    1. Pneumonia due to COVID-19 virus (Primary)  -     promethazine-codeine (PHENERGAN with CODEINE) 6.25-10 MG/5ML syrup; Take 5 mL by mouth Every 4 (Four) Hours As Needed for Cough.  Dispense: 240 mL; Refill: 0    Other orders  -     budesonide-formoterol (Symbicort) 80-4.5 MCG/ACT inhaler; Inhale 2 puffs 2 (Two) Times a Day.  Dispense: 1 inhaler; Refill: 0    You have chosen to receive care through a telephone visit. Do you consent to use a telephone visit for your medical care today? Yes  This visit has been rescheduled as a phone visit to comply with patient safety concerns in accordance with CDC recommendations. Total time of discussion was 25 minutes.  Records reviewed  Will treat with symbicort above  Will treat with phenergan with codiene prn cough  Work excuse and paperwork will be completed for one week and he will follow up for recheck in one week or sooner prn   Continue antibiotics until gone  Pt aware and in agreement to this plan  All questions and concerns are addressed with understanding noted

## 2021-02-01 ENCOUNTER — OFFICE VISIT (OUTPATIENT)
Dept: FAMILY MEDICINE CLINIC | Facility: CLINIC | Age: 56
End: 2021-02-01

## 2021-02-01 DIAGNOSIS — E11.43 TYPE 2 DIABETES MELLITUS WITH DIABETIC AUTONOMIC NEUROPATHY, WITH LONG-TERM CURRENT USE OF INSULIN (HCC): Chronic | ICD-10-CM

## 2021-02-01 DIAGNOSIS — J12.82 PNEUMONIA DUE TO COVID-19 VIRUS: Primary | ICD-10-CM

## 2021-02-01 DIAGNOSIS — U07.1 PNEUMONIA DUE TO COVID-19 VIRUS: Primary | ICD-10-CM

## 2021-02-01 DIAGNOSIS — Z79.4 TYPE 2 DIABETES MELLITUS WITH DIABETIC AUTONOMIC NEUROPATHY, WITH LONG-TERM CURRENT USE OF INSULIN (HCC): Chronic | ICD-10-CM

## 2021-02-01 DIAGNOSIS — I10 ESSENTIAL HYPERTENSION: Chronic | ICD-10-CM

## 2021-02-01 DIAGNOSIS — E66.09 CLASS 1 OBESITY DUE TO EXCESS CALORIES WITH SERIOUS COMORBIDITY AND BODY MASS INDEX (BMI) OF 34.0 TO 34.9 IN ADULT: Chronic | ICD-10-CM

## 2021-02-01 PROCEDURE — 99443 PR PHYS/QHP TELEPHONE EVALUATION 21-30 MIN: CPT | Performed by: NURSE PRACTITIONER

## 2021-02-01 RX ORDER — ALBUTEROL SULFATE 1.25 MG/3ML
1 SOLUTION RESPIRATORY (INHALATION) EVERY 6 HOURS PRN
Qty: 1 ML | Refills: 2 | Status: SHIPPED | OUTPATIENT
Start: 2021-02-01 | End: 2022-04-25

## 2021-02-01 NOTE — PROGRESS NOTES
Subjective   Sharan Gomez is a 55 y.o. male. Patient here today with complaints of Follow-up  Patient here today for telehealth visit for recheck of Covid pneumonia, he states he still feels weak but he has noticed improvement.  Still has congestion in head and chest as well as nonproductive cough but denies any fever and states that his oxygen level is running 95 to 97% at home.  He is concerned that when he goes back to work he will need to walk for 12 hours during his shift to be on his feet and he does not know if he is able to do that at this time.  Reports he does not think he will be able to work less hours either.  Recently finished his antibiotic, still taking cough medicine as needed and inhalers and nebulizers.  Needing refill on albuterol nebulizer.    Vitals:    02/01/21 0809   PainSc: 0-No pain     There is no height or weight on file to calculate BMI.  Past Medical History:   Diagnosis Date   • Elevated blood pressure    • Encounter for general adult medical examination without abnormal findings    • Essential hypertension    • Hyperlipidemia    • Kidney stone    • Left flank pain    • Low back pain    • Muscle strain    • Type 2 diabetes mellitus (CMS/HCC)      Pneumonia  He complains of cough. The current episode started 1 to 4 weeks ago. The problem occurs constantly. The problem has been gradually improving. The cough is non-productive. Associated symptoms include malaise/fatigue and nasal congestion. Pertinent negatives include no chest pain or fever. His symptoms are alleviated by rest, steroid inhaler and beta-agonist. He reports minimal improvement on treatment. His past medical history is significant for pneumonia. Past medical history comments: COVID-19  .        The following portions of the patient's history were reviewed and updated as appropriate: allergies, current medications, past family history, past medical history, past social history, past surgical history and problem  list.    Review of Systems   Constitutional: Positive for activity change, fatigue and malaise/fatigue. Negative for fever.   Respiratory: Positive for cough.    Cardiovascular: Negative for chest pain.       Objective   Physical Exam  Deferred, telehealth visit  Assessment/Plan   Diagnoses and all orders for this visit:    1. Pneumonia due to COVID-19 virus (Primary)  -     albuterol (ACCUNEB) 1.25 MG/3ML nebulizer solution; Take 3 mL by nebulization Every 6 (Six) Hours As Needed for Wheezing.  Dispense: 1 mL; Refill: 2    2. Essential hypertension    3. Type 2 diabetes mellitus with diabetic autonomic neuropathy, with long-term current use of insulin (CMS/[facility])    4. Class 1 obesity due to excess calories with serious comorbidity and body mass index (BMI) of 34.0 to 34.9 in adult      After much discussion with patient we have opted to keep him off of work for another week since his energy level is just not back to where it needs to be for him to work 12 hours.  Will follow back up with him in 1 week in hopes that he will be able to return to work at that time.  Paperwork will be completed for his employer.  Continue with cough medicine as needed albuterol nebs as needed and Symbicort twice daily.  He will return sooner if problems arise or if he feels like he is able to go back to work sooner than 1 week weekend make him an appointment earlier.  Patient aware and in agreement to this plan.  All questions and concerns addressed with understanding verbalized.  You have chosen to receive care through a telephone visit. Do you consent to use a telephone visit for your medical care today? Yes  This visit has been rescheduled as a phone visit to comply with patient safety concerns in accordance with CDC recommendations. Total time of discussion was 19 minutes.

## 2021-02-08 ENCOUNTER — OFFICE VISIT (OUTPATIENT)
Dept: FAMILY MEDICINE CLINIC | Facility: CLINIC | Age: 56
End: 2021-02-08

## 2021-02-08 DIAGNOSIS — U07.1 COVID-19: ICD-10-CM

## 2021-02-08 DIAGNOSIS — J12.82 PNEUMONIA DUE TO COVID-19 VIRUS: Primary | ICD-10-CM

## 2021-02-08 DIAGNOSIS — U07.1 PNEUMONIA DUE TO COVID-19 VIRUS: Primary | ICD-10-CM

## 2021-02-08 PROCEDURE — 99442 PR PHYS/QHP TELEPHONE EVALUATION 11-20 MIN: CPT | Performed by: NURSE PRACTITIONER

## 2021-02-08 RX ORDER — BENZONATATE 100 MG/1
100 CAPSULE ORAL 3 TIMES DAILY PRN
Qty: 30 CAPSULE | Refills: 0 | Status: SHIPPED | OUTPATIENT
Start: 2021-02-08 | End: 2022-04-25

## 2021-02-08 NOTE — PROGRESS NOTES
Subjective   Sharan Gomez is a 55 y.o. male. Patient here today with complaints of Follow-up  pt here today for recheck of cough and pneumonia, was dx with covid on 1-2-21 and has had significant fatigue , cough and covid symptoms since then. States he now feels he is able to go back to work. He is still coughing, taking phenergan with codeine for cough which causes him to be sleepy but does help.     There were no vitals filed for this visit.  There is no height or weight on file to calculate BMI.  Past Medical History:   Diagnosis Date   • Elevated blood pressure    • Encounter for general adult medical examination without abnormal findings    • Essential hypertension    • Hyperlipidemia    • Kidney stone    • Left flank pain    • Low back pain    • Muscle strain    • Type 2 diabetes mellitus (CMS/HCC)      Pneumonia  He complains of cough. The current episode started more than 1 month ago. The problem occurs intermittently. The problem has been gradually improving. Associated symptoms include malaise/fatigue and nasal congestion. His symptoms are alleviated by rest and prescription cough suppressant. He reports moderate improvement on treatment. His symptoms are not alleviated by beta-agonist and steroid inhaler. His past medical history is significant for pneumonia.        The following portions of the patient's history were reviewed and updated as appropriate: allergies, current medications, past family history, past medical history, past social history, past surgical history and problem list.    Review of Systems   Constitutional: Positive for malaise/fatigue.   Respiratory: Positive for cough.        Objective   Physical Exam  Deferred, telephone visit   Assessment/Plan   Diagnoses and all orders for this visit:    1. Pneumonia due to COVID-19 virus (Primary)    2. COVID-19    Other orders  -     benzonatate (Tessalon Perles) 100 MG capsule; Take 1 capsule by mouth 3 (Three) Times a Day As Needed for  Cough.  Dispense: 30 capsule; Refill: 0    You have chosen to receive care through a telephone visit. Do you consent to use a telephone visit for your medical care today? Yes  This visit has been rescheduled as a phone visit to comply with patient safety concerns in accordance with CDC recommendations. Total time of discussion was 18 minutes.  Will change from taking phenergan with codeine prn cough to tessalon prn cough which is prescribed for him today so that he can work while taking tessalon  Release for him to go back to work is given to him today  Advised to follow up here if symptoms worsen or if he is unable to perform his job duties  Will otherwise follow up as scheduled for chronic conditions  He is aware and is in agreement to this plan  All questions and concerns are addressed with understanding noted

## 2021-02-15 RX ORDER — EMPAGLIFLOZIN 25 MG/1
TABLET, FILM COATED ORAL
Qty: 30 TABLET | Refills: 2 | Status: SHIPPED | OUTPATIENT
Start: 2021-02-15 | End: 2021-04-20

## 2021-02-18 RX ORDER — DILTIAZEM HYDROCHLORIDE 60 MG/1
TABLET, FILM COATED ORAL
Qty: 10.2 G | Refills: 0 | Status: SHIPPED | OUTPATIENT
Start: 2021-02-18 | End: 2022-04-25

## 2021-04-20 RX ORDER — EMPAGLIFLOZIN 25 MG/1
TABLET, FILM COATED ORAL
Qty: 30 TABLET | Refills: 2 | Status: SHIPPED | OUTPATIENT
Start: 2021-04-20 | End: 2021-08-12

## 2021-05-13 RX ORDER — ATORVASTATIN CALCIUM 20 MG/1
TABLET, FILM COATED ORAL
Qty: 30 TABLET | Refills: 5 | Status: SHIPPED | OUTPATIENT
Start: 2021-05-13 | End: 2021-11-15

## 2021-06-14 RX ORDER — LOSARTAN POTASSIUM 50 MG/1
TABLET ORAL
Qty: 30 TABLET | Refills: 5 | Status: SHIPPED | OUTPATIENT
Start: 2021-06-14 | End: 2022-02-07

## 2021-06-15 RX ORDER — GLIMEPIRIDE 4 MG/1
4 TABLET ORAL
Qty: 30 TABLET | Refills: 0 | Status: SHIPPED | OUTPATIENT
Start: 2021-06-15 | End: 2021-07-19

## 2021-07-19 RX ORDER — GLIMEPIRIDE 4 MG/1
4 TABLET ORAL
Qty: 30 TABLET | Refills: 2 | Status: SHIPPED | OUTPATIENT
Start: 2021-07-19 | End: 2021-10-08

## 2021-08-12 RX ORDER — EMPAGLIFLOZIN 25 MG/1
TABLET, FILM COATED ORAL
Qty: 30 TABLET | Refills: 0 | Status: SHIPPED | OUTPATIENT
Start: 2021-08-12 | End: 2021-09-16 | Stop reason: SDUPTHER

## 2021-09-07 RX ORDER — DULAGLUTIDE 3 MG/.5ML
INJECTION, SOLUTION SUBCUTANEOUS
Qty: 4 ML | Refills: 4 | Status: SHIPPED | OUTPATIENT
Start: 2021-09-07 | End: 2022-08-16

## 2021-09-15 RX ORDER — EMPAGLIFLOZIN 25 MG/1
TABLET, FILM COATED ORAL
Qty: 30 TABLET | Refills: 0 | OUTPATIENT
Start: 2021-09-15

## 2021-09-16 ENCOUNTER — OFFICE VISIT (OUTPATIENT)
Dept: FAMILY MEDICINE CLINIC | Facility: CLINIC | Age: 56
End: 2021-09-16

## 2021-09-16 VITALS
TEMPERATURE: 97.9 F | HEART RATE: 71 BPM | DIASTOLIC BLOOD PRESSURE: 84 MMHG | SYSTOLIC BLOOD PRESSURE: 128 MMHG | WEIGHT: 202.2 LBS | HEIGHT: 66 IN | BODY MASS INDEX: 32.5 KG/M2 | OXYGEN SATURATION: 98 %

## 2021-09-16 DIAGNOSIS — Z11.59 NEED FOR HEPATITIS C SCREENING TEST: ICD-10-CM

## 2021-09-16 DIAGNOSIS — I10 ESSENTIAL HYPERTENSION: Chronic | ICD-10-CM

## 2021-09-16 DIAGNOSIS — L30.9 DERMATITIS: ICD-10-CM

## 2021-09-16 DIAGNOSIS — Z12.11 ENCOUNTER FOR SCREENING FOR MALIGNANT NEOPLASM OF COLON: ICD-10-CM

## 2021-09-16 DIAGNOSIS — E78.2 MIXED HYPERLIPIDEMIA: Chronic | ICD-10-CM

## 2021-09-16 DIAGNOSIS — E11.00 TYPE 2 DIABETES MELLITUS WITH HYPEROSMOLARITY WITHOUT COMA, WITHOUT LONG-TERM CURRENT USE OF INSULIN (HCC): Primary | Chronic | ICD-10-CM

## 2021-09-16 PROCEDURE — 99214 OFFICE O/P EST MOD 30 MIN: CPT | Performed by: NURSE PRACTITIONER

## 2021-09-16 NOTE — PROGRESS NOTES
"Chief Complaint  Med Refill    Subjective          The patient presents today for medication refill and for recheck of hypertension, hyperlipidemia, diabetes, and GERD. Also, he would like his next labs to include testing for alpha-gal syndrome as he has broken out in hives and felt sick to his stomach the last few times he has eaten beef. He did have 1 tick bite this year a few months ago.    He admits that he has not been checking his blood glucose or blood pressures consistently, but states that he has been feeling fine. In addition, the patient reports tenderness in his thumb which was treated at urgent care, dx tendonitis. At the time, he could hardly bend it. He was treated with 3 days of oral steroid and wears a brace at work. States he still has pain off and on however. He is R handed.     Sharan Gomez presents to [facility]  History of Present Illness    Objective   Vital Signs:   /84   Pulse 71   Temp 97.9 °F (36.6 °C)   Ht 167.6 cm (66\")   Wt 91.7 kg (202 lb 3.2 oz)   SpO2 98%   BMI 32.64 kg/m²     Physical Exam  Vitals and nursing note reviewed.   Constitutional:       General: He is not in acute distress.     Appearance: Normal appearance. He is not ill-appearing, toxic-appearing or diaphoretic.   HENT:      Head: Normocephalic and atraumatic.   Neck:      Vascular: No carotid bruit.   Cardiovascular:      Rate and Rhythm: Normal rate and regular rhythm.      Pulses: Normal pulses.      Heart sounds: Normal heart sounds. No murmur heard.   No friction rub. No gallop.    Pulmonary:      Effort: Pulmonary effort is normal. No respiratory distress.      Breath sounds: Normal breath sounds. No wheezing, rhonchi or rales.   Musculoskeletal:      Right lower leg: No edema.      Left lower leg: No edema.   Skin:     General: Skin is warm and dry.      Coloration: Skin is not jaundiced or pale.      Findings: No bruising, erythema, lesion " Please fax our office notes and lab work to [facility] at [phone]  Will need patient approval for release of records   Thank you or rash.   Neurological:      Mental Status: He is alert and oriented to person, place, and time.   Psychiatric:         Mood and Affect: Mood normal.         Behavior: Behavior normal.         Thought Content: Thought content normal.         Judgment: Judgment normal.        Result Review :     Common labs    Common Labsle 11/9/20 11/9/20 11/9/20 11/9/20 3/15/21    0841 0841 0841 0841    Glucose  184 (A)      BUN  14      Creatinine  1.15      eGFR Non African Am  67      Sodium  137      Potassium  4.1      Chloride  102      Calcium  9.1      Albumin  4.40      Total Bilirubin  1.2      Alkaline Phosphatase  84      AST (SGOT)  27      ALT (SGPT)  38      WBC 10.29       Hemoglobin 17.5       Hematocrit 50.3       Platelets 267       Total Cholesterol   145 (A)     Triglycerides   166 (A)     HDL Cholesterol   41     LDL Cholesterol    76     Hemoglobin A1C    8.10 (A)    PSA     1.7   (A) Abnormal value       Comments are available for some flowsheets but are not being displayed.           CMP    CMP 11/9/20   Glucose 184 (A)   BUN 14   Creatinine 1.15   eGFR Non African Am 67   Sodium 137   Potassium 4.1   Chloride 102   Calcium 9.1   Albumin 4.40   Total Bilirubin 1.2   Alkaline Phosphatase 84   AST (SGOT) 27   ALT (SGPT) 38   (A) Abnormal value            CBC    CBC 11/9/20   WBC 10.29   RBC 5.73   Hemoglobin 17.5   Hematocrit 50.3   MCV 87.8   MCH 30.5   MCHC 34.8   RDW 12.3   Platelets 267           CBC w/diff    CBC w/Diff 11/9/20   WBC 10.29   RBC 5.73   Hemoglobin 17.5   Hematocrit 50.3   MCV 87.8   MCH 30.5   MCHC 34.8   RDW 12.3   Platelets 267   Neutrophil Rel % 63.1   Lymphocyte Rel % 24.2   Monocyte Rel % 9.6   Eosinophil Rel % 2.5   Basophil Rel % 0.6           Lipid Panel    Lipid Panel 11/9/20   Total Cholesterol 145 (A)   Triglycerides 166 (A)   HDL Cholesterol 41   VLDL Cholesterol 28   LDL Cholesterol  76   LDL/HDL Ratio 1.73   (A) Abnormal value                          Assessment and Plan     Diagnoses and all orders for this visit:    1. Type 2 diabetes mellitus with hyperosmolarity without coma, without long-term current use of insulin (CMS/HCC) (Primary)  -     empagliflozin (Jardiance) 25 MG tablet tablet; Take 1 tablet by mouth Daily.  Dispense: 30 tablet; Refill: 2  -     Microalbumin / Creatinine Urine Ratio - Urine, Clean Catch  -     CBC & Differential; Future  -     Comprehensive metabolic panel; Future  -     Hemoglobin A1c; Future  -     Urinalysis With Culture If Indicated -; Future  -     TSH; Future  -     Lipid panel; Future    2. Encounter for screening for malignant neoplasm of colon  -     Ambulatory Referral For Screening Colonoscopy    3. Need for hepatitis C screening test  -     Hepatitis C Antibody; Future    4. Essential hypertension  -     CBC & Differential; Future  -     Comprehensive metabolic panel; Future  -     Urinalysis With Culture If Indicated -; Future  -     TSH; Future  -     Lipid panel; Future    5. Mixed hyperlipidemia  -     CBC & Differential; Future  -     Comprehensive metabolic panel; Future  -     Urinalysis With Culture If Indicated -; Future  -     TSH; Future  -     Lipid panel; Future    6. Dermatitis  Comments:  after eating beef and pork with hx tick bite a few months ago   Orders:  -     Alpha - Gal Panel; Future      He is referred on for colonoscopy. This will be a screening colonoscopy for him. Labs will be obtained as above and he will be informed of the results by phone. He is advised to avoid beef and pork and keep Benadryl close for any accidental ingestion of either of these. If positive, I will likely need to give him an EpiPen and referral to an allergist. He will have his eye exam with Dr. Fan as scheduled in a week or 2. He will follow up with me in about 6 months for recheck or sooner if needed. All questions and concerns are addressed with understanding noted. The patient is in agreement to above plan.    I spent 33 minutes caring  nandini Tsang on this date of service. This time includes time spent by me in the following activities:preparing for the visit, reviewing tests, performing a medically appropriate examination and/or evaluation , counseling and educating the patient/family/caregiver, ordering medications, tests, or procedures and documenting information in the medical record  Follow Up   Return in about 6 months (around 3/16/2022), or if symptoms worsen or fail to improve, for Recheck, or sooner as needed.  Patient was given instructions and counseling regarding his condition or for health maintenance advice. Please see specific information pulled into the AVS if appropriate.     Transcribed from ambient dictation for BRYANT Guzman by Meeta Comer.  09/16/21   15:34 CDT    I have personally performed the services described in this document as transcribed by the above individual, and it is both accurate and complete.  BRYANT Guzman  9/16/2021  16:25 CDT

## 2021-09-17 ENCOUNTER — LAB (OUTPATIENT)
Dept: LAB | Facility: OTHER | Age: 56
End: 2021-09-17

## 2021-09-17 DIAGNOSIS — E11.00 TYPE 2 DIABETES MELLITUS WITH HYPEROSMOLARITY WITHOUT COMA, WITHOUT LONG-TERM CURRENT USE OF INSULIN (HCC): ICD-10-CM

## 2021-09-17 DIAGNOSIS — Z11.59 NEED FOR HEPATITIS C SCREENING TEST: ICD-10-CM

## 2021-09-17 DIAGNOSIS — E78.2 MIXED HYPERLIPIDEMIA: ICD-10-CM

## 2021-09-17 DIAGNOSIS — I10 ESSENTIAL HYPERTENSION: ICD-10-CM

## 2021-09-17 DIAGNOSIS — L30.9 DERMATITIS: ICD-10-CM

## 2021-09-17 LAB
ALBUMIN SERPL-MCNC: 4.5 G/DL (ref 3.5–5)
ALBUMIN/GLOB SERPL: 1.4 G/DL (ref 1.1–1.8)
ALP SERPL-CCNC: 89 U/L (ref 38–126)
ALT SERPL W P-5'-P-CCNC: 42 U/L
ANION GAP SERPL CALCULATED.3IONS-SCNC: 11 MMOL/L (ref 5–15)
AST SERPL-CCNC: 30 U/L (ref 17–59)
BASOPHILS # BLD AUTO: 0.07 10*3/MM3 (ref 0–0.2)
BASOPHILS NFR BLD AUTO: 0.6 % (ref 0–1.5)
BILIRUB SERPL-MCNC: 2.7 MG/DL (ref 0.2–1.3)
BILIRUB UR QL STRIP: NEGATIVE
BUN SERPL-MCNC: 15 MG/DL (ref 7–23)
BUN/CREAT SERPL: 14.7 (ref 7–25)
CALCIUM SPEC-SCNC: 9.1 MG/DL (ref 8.4–10.2)
CHLORIDE SERPL-SCNC: 103 MMOL/L (ref 101–112)
CHOLEST SERPL-MCNC: 157 MG/DL (ref 150–200)
CLARITY UR: CLEAR
CO2 SERPL-SCNC: 25 MMOL/L (ref 22–30)
COLOR UR: YELLOW
CREAT SERPL-MCNC: 1.02 MG/DL (ref 0.7–1.3)
DEPRECATED RDW RBC AUTO: 39.8 FL (ref 37–54)
EOSINOPHIL # BLD AUTO: 0.24 10*3/MM3 (ref 0–0.4)
EOSINOPHIL NFR BLD AUTO: 2 % (ref 0.3–6.2)
ERYTHROCYTE [DISTWIDTH] IN BLOOD BY AUTOMATED COUNT: 12.4 % (ref 12.3–15.4)
GFR SERPL CREATININE-BSD FRML MDRD: 76 ML/MIN/1.73 (ref 56–130)
GLOBULIN UR ELPH-MCNC: 3.3 GM/DL (ref 2.3–3.5)
GLUCOSE SERPL-MCNC: 132 MG/DL (ref 70–99)
GLUCOSE UR STRIP-MCNC: ABNORMAL MG/DL
HCT VFR BLD AUTO: 48.7 % (ref 37.5–51)
HDLC SERPL-MCNC: 38 MG/DL (ref 40–59)
HGB BLD-MCNC: 17.2 G/DL (ref 13–17.7)
HGB UR QL STRIP.AUTO: NEGATIVE
KETONES UR QL STRIP: NEGATIVE
LDLC SERPL CALC-MCNC: 90 MG/DL
LDLC/HDLC SERPL: 2.25 {RATIO} (ref 0–3.55)
LEUKOCYTE ESTERASE UR QL STRIP.AUTO: NEGATIVE
LYMPHOCYTES # BLD AUTO: 2.75 10*3/MM3 (ref 0.7–3.1)
LYMPHOCYTES NFR BLD AUTO: 23 % (ref 19.6–45.3)
MCH RBC QN AUTO: 31.3 PG (ref 26.6–33)
MCHC RBC AUTO-ENTMCNC: 35.3 G/DL (ref 31.5–35.7)
MCV RBC AUTO: 88.7 FL (ref 79–97)
MONOCYTES # BLD AUTO: 1.38 10*3/MM3 (ref 0.1–0.9)
MONOCYTES NFR BLD AUTO: 11.6 % (ref 5–12)
NEUTROPHILS NFR BLD AUTO: 62.8 % (ref 42.7–76)
NEUTROPHILS NFR BLD AUTO: 7.5 10*3/MM3 (ref 1.7–7)
NITRITE UR QL STRIP: NEGATIVE
PH UR STRIP.AUTO: 6 [PH] (ref 5.5–8)
PLATELET # BLD AUTO: 219 10*3/MM3 (ref 140–450)
PMV BLD AUTO: 9.6 FL (ref 6–12)
POTASSIUM SERPL-SCNC: 3.5 MMOL/L (ref 3.4–5)
PROT SERPL-MCNC: 7.8 G/DL (ref 6.3–8.6)
PROT UR QL STRIP: NEGATIVE
RBC # BLD AUTO: 5.49 10*6/MM3 (ref 4.14–5.8)
SODIUM SERPL-SCNC: 139 MMOL/L (ref 137–145)
SP GR UR STRIP: 1.01 (ref 1–1.03)
TRIGL SERPL-MCNC: 168 MG/DL
UROBILINOGEN UR QL STRIP: ABNORMAL
VLDLC SERPL-MCNC: 29 MG/DL (ref 5–40)
WBC # BLD AUTO: 11.94 10*3/MM3 (ref 3.4–10.8)

## 2021-09-17 PROCEDURE — 82570 ASSAY OF URINE CREATININE: CPT | Performed by: NURSE PRACTITIONER

## 2021-09-17 PROCEDURE — 86803 HEPATITIS C AB TEST: CPT | Performed by: NURSE PRACTITIONER

## 2021-09-17 PROCEDURE — 80053 COMPREHEN METABOLIC PANEL: CPT | Performed by: NURSE PRACTITIONER

## 2021-09-17 PROCEDURE — 82043 UR ALBUMIN QUANTITATIVE: CPT | Performed by: NURSE PRACTITIONER

## 2021-09-17 PROCEDURE — 86003 ALLG SPEC IGE CRUDE XTRC EA: CPT | Performed by: NURSE PRACTITIONER

## 2021-09-17 PROCEDURE — 85025 COMPLETE CBC W/AUTO DIFF WBC: CPT | Performed by: NURSE PRACTITIONER

## 2021-09-17 PROCEDURE — 83036 HEMOGLOBIN GLYCOSYLATED A1C: CPT | Performed by: NURSE PRACTITIONER

## 2021-09-17 PROCEDURE — 36415 COLL VENOUS BLD VENIPUNCTURE: CPT | Performed by: NURSE PRACTITIONER

## 2021-09-17 PROCEDURE — 80061 LIPID PANEL: CPT | Performed by: NURSE PRACTITIONER

## 2021-09-17 PROCEDURE — 81003 URINALYSIS AUTO W/O SCOPE: CPT | Performed by: NURSE PRACTITIONER

## 2021-09-17 PROCEDURE — 86008 ALLG SPEC IGE RECOMB EA: CPT | Performed by: NURSE PRACTITIONER

## 2021-09-17 PROCEDURE — 84443 ASSAY THYROID STIM HORMONE: CPT | Performed by: NURSE PRACTITIONER

## 2021-09-18 LAB
ALBUMIN UR-MCNC: <1.2 MG/DL
CREAT UR-MCNC: 143.4 MG/DL
HBA1C MFR BLD: 7.82 % (ref 4.8–5.6)
HCV AB SER DONR QL: NORMAL
MICROALBUMIN/CREAT UR: NORMAL MG/G{CREAT}
TSH SERPL DL<=0.05 MIU/L-ACNC: 1.45 UIU/ML (ref 0.27–4.2)

## 2021-09-23 LAB
ALPHA-GAL IGE QN: 18.7 KU/L
BEEF IGE QN: 9.5 KU/L
DEPRECATED BEEF IGE RAST QL: 3
DEPRECATED LAMB IGE RAST QL: 2
DEPRECATED PORK IGE RAST QL: 3
LAMB IGE QN: 3.43 KU/L
PORK IGE QN: 4.9 KU/L

## 2021-09-28 DIAGNOSIS — Z91.018 ALLERGY TO ALPHA-GAL: Primary | ICD-10-CM

## 2021-09-28 RX ORDER — EPINEPHRINE 0.3 MG/.3ML
0.3 INJECTION SUBCUTANEOUS ONCE
Qty: 1 EACH | Refills: 0 | Status: SHIPPED | OUTPATIENT
Start: 2021-09-28 | End: 2021-09-28

## 2021-10-08 DIAGNOSIS — E11.00 TYPE 2 DIABETES MELLITUS WITH HYPEROSMOLARITY WITHOUT COMA, WITHOUT LONG-TERM CURRENT USE OF INSULIN (HCC): Primary | ICD-10-CM

## 2021-10-08 RX ORDER — GLIMEPIRIDE 4 MG/1
4 TABLET ORAL
Qty: 30 TABLET | Refills: 2 | Status: SHIPPED | OUTPATIENT
Start: 2021-10-08 | End: 2022-03-14

## 2021-10-10 NOTE — PROGRESS NOTES
"Subjective   Chief Complaint   Patient presents with   • Diabetes     129   • Hyperglycemia     Sharan Gomez is a 52 y.o. male.   Diabetes (129) and Hyperglycemia    History of Present Illness     Jacquelyn Johnson patient    Presents today for issues with hyperglycemia secondary to diabetes.  He has been taking amaryl 4mg for over a month but then realized that he was taking the medication wrong as the prescription was written for him to take 1/2 tablet daily.  He adjusted this and noticed that his blood glucose started trending up to 200-300.  He admits to not checking his blood glucose regularly.  He works third shift and is just now getting off work.  He admits to not taking his blood pressure medication today either.  Complaining of fatigue, headaches.  He does not follow a diabetic diet.  He has also noticed that the manufacture of the prescription has changed and his wife and the patient believe this has made a difference in control of his blood glucose levels.    The following portions of the patient's history were reviewed and updated as appropriate: allergies, current medications, past family history, past medical history, past social history, past surgical history and problem list.    Review of Systems   Constitutional: Negative for appetite change, chills, fatigue and fever.   HENT: Negative for congestion, ear pain, rhinorrhea and sore throat.    Eyes: Negative for pain.   Respiratory: Negative for cough and shortness of breath.    Cardiovascular: Negative for chest pain and palpitations.   Gastrointestinal: Negative for abdominal pain, constipation, diarrhea and nausea.   Genitourinary: Negative for dysuria.   Musculoskeletal: Negative for back pain, joint swelling and neck pain.   Skin: Negative for rash.   Neurological: Positive for headaches. Negative for dizziness.       Objective   /82  Pulse 82  Ht 66\" (167.6 cm)  Wt 208 lb (94.3 kg)  BMI 33.57 kg/m2  Physical Exam   Constitutional: He is " oriented to person, place, and time. He appears well-developed and well-nourished.   HENT:   Head: Normocephalic and atraumatic.   Eyes: Pupils are equal, round, and reactive to light.   Neck: Normal range of motion. Neck supple.   Cardiovascular: Normal rate, regular rhythm and normal heart sounds.    Pulmonary/Chest: Effort normal and breath sounds normal. No respiratory distress. He has no wheezes. He has no rales.   Abdominal: Soft. Bowel sounds are normal.   Musculoskeletal: Normal range of motion.   Neurological: He is alert and oriented to person, place, and time.   Skin: Skin is warm and dry.   Psychiatric: He has a normal mood and affect.   Nursing note and vitals reviewed.      Assessment/Plan   Problems Addressed this Visit        Cardiovascular and Mediastinum    Essential hypertension       Endocrine    Type 2 diabetes mellitus - Primary    Relevant Medications    glimepiride (AMARYL) 4 MG tablet      Other Visit Diagnoses     Encounter for diabetes education        Relevant Medications    glimepiride (AMARYL) 4 MG tablet    Encounter for medication adjustment            Educated on medication  Adjusted prescription to 4mg daily  Discussed a diabetic diet  Discussed management with daily monitoring  Provided a blood glucose log - to record fasting blood glucose levels daily  Recommend fasting blood glucose be between   If levels start to trend down less than <70  Cut table in half - 2mg daily  Take blood pressure medication when he gets home  Recheck with Jacquelyn next week  Bring log to appointment  Call with any problems or concerns  Time in room with patient - 25 mins            Additional Complaints

## 2021-11-15 RX ORDER — ATORVASTATIN CALCIUM 20 MG/1
TABLET, FILM COATED ORAL
Qty: 30 TABLET | Refills: 5 | Status: SHIPPED | OUTPATIENT
Start: 2021-11-15 | End: 2022-06-20

## 2022-02-03 DIAGNOSIS — E11.00 TYPE 2 DIABETES MELLITUS WITH HYPEROSMOLARITY WITHOUT COMA, WITHOUT LONG-TERM CURRENT USE OF INSULIN: Chronic | ICD-10-CM

## 2022-02-07 RX ORDER — EMPAGLIFLOZIN 25 MG/1
TABLET, FILM COATED ORAL
Qty: 30 TABLET | Refills: 2 | Status: SHIPPED | OUTPATIENT
Start: 2022-02-07 | End: 2022-04-14

## 2022-02-07 RX ORDER — LOSARTAN POTASSIUM 50 MG/1
TABLET ORAL
Qty: 30 TABLET | Refills: 2 | Status: SHIPPED | OUTPATIENT
Start: 2022-02-07 | End: 2022-04-14

## 2022-03-12 DIAGNOSIS — E11.00 TYPE 2 DIABETES MELLITUS WITH HYPEROSMOLARITY WITHOUT COMA, WITHOUT LONG-TERM CURRENT USE OF INSULIN: ICD-10-CM

## 2022-03-14 RX ORDER — GLIMEPIRIDE 4 MG/1
4 TABLET ORAL
Qty: 30 TABLET | Refills: 0 | Status: SHIPPED | OUTPATIENT
Start: 2022-03-14 | End: 2022-04-07

## 2022-04-05 DIAGNOSIS — E11.00 TYPE 2 DIABETES MELLITUS WITH HYPEROSMOLARITY WITHOUT COMA, WITHOUT LONG-TERM CURRENT USE OF INSULIN: ICD-10-CM

## 2022-04-07 RX ORDER — GLIMEPIRIDE 4 MG/1
4 TABLET ORAL
Qty: 30 TABLET | Refills: 0 | Status: SHIPPED | OUTPATIENT
Start: 2022-04-07 | End: 2022-05-12

## 2022-04-14 DIAGNOSIS — E11.00 TYPE 2 DIABETES MELLITUS WITH HYPEROSMOLARITY WITHOUT COMA, WITHOUT LONG-TERM CURRENT USE OF INSULIN: Chronic | ICD-10-CM

## 2022-04-14 RX ORDER — EMPAGLIFLOZIN 25 MG/1
TABLET, FILM COATED ORAL
Qty: 30 TABLET | Refills: 0 | Status: SHIPPED | OUTPATIENT
Start: 2022-04-14 | End: 2022-06-16

## 2022-04-14 RX ORDER — LOSARTAN POTASSIUM 50 MG/1
TABLET ORAL
Qty: 30 TABLET | Refills: 0 | Status: SHIPPED | OUTPATIENT
Start: 2022-04-14 | End: 2022-06-16

## 2022-04-18 DIAGNOSIS — E11.00 TYPE 2 DIABETES MELLITUS WITH HYPEROSMOLARITY WITHOUT COMA, WITHOUT LONG-TERM CURRENT USE OF INSULIN: Primary | ICD-10-CM

## 2022-04-18 DIAGNOSIS — I10 ESSENTIAL HYPERTENSION: ICD-10-CM

## 2022-04-19 ENCOUNTER — LAB (OUTPATIENT)
Dept: LAB | Facility: OTHER | Age: 57
End: 2022-04-19

## 2022-04-19 DIAGNOSIS — E11.00 TYPE 2 DIABETES MELLITUS WITH HYPEROSMOLARITY WITHOUT COMA, WITHOUT LONG-TERM CURRENT USE OF INSULIN: ICD-10-CM

## 2022-04-19 DIAGNOSIS — I10 ESSENTIAL HYPERTENSION: ICD-10-CM

## 2022-04-19 LAB
ALBUMIN SERPL-MCNC: 4.5 G/DL (ref 3.5–5)
ALBUMIN/GLOB SERPL: 1.2 G/DL (ref 1.1–1.8)
ALP SERPL-CCNC: 106 U/L (ref 38–126)
ALT SERPL W P-5'-P-CCNC: 49 U/L
ANION GAP SERPL CALCULATED.3IONS-SCNC: 9 MMOL/L (ref 5–15)
AST SERPL-CCNC: 31 U/L (ref 17–59)
BILIRUB SERPL-MCNC: 1.3 MG/DL (ref 0.2–1.3)
BILIRUB UR QL STRIP: NEGATIVE
BUN SERPL-MCNC: 10 MG/DL (ref 7–23)
BUN/CREAT SERPL: 9.8 (ref 7–25)
CALCIUM SPEC-SCNC: 9.3 MG/DL (ref 8.4–10.2)
CHLORIDE SERPL-SCNC: 100 MMOL/L (ref 101–112)
CHOLEST SERPL-MCNC: 186 MG/DL (ref 150–200)
CLARITY UR: CLEAR
CO2 SERPL-SCNC: 29 MMOL/L (ref 22–30)
COLOR UR: ABNORMAL
CREAT SERPL-MCNC: 1.02 MG/DL (ref 0.7–1.3)
DEPRECATED RDW RBC AUTO: 39 FL (ref 37–54)
EGFRCR SERPLBLD CKD-EPI 2021: 87.3 ML/MIN/1.73
EOSINOPHIL # BLD MANUAL: 0.11 10*3/MM3 (ref 0–0.4)
EOSINOPHIL NFR BLD MANUAL: 1 % (ref 0.3–6.2)
ERYTHROCYTE [DISTWIDTH] IN BLOOD BY AUTOMATED COUNT: 12.4 % (ref 12.3–15.4)
GIANT PLATELETS: ABNORMAL
GLOBULIN UR ELPH-MCNC: 3.9 GM/DL (ref 2.3–3.5)
GLUCOSE SERPL-MCNC: 193 MG/DL (ref 70–99)
GLUCOSE UR STRIP-MCNC: ABNORMAL MG/DL
HCT VFR BLD AUTO: 53.6 % (ref 37.5–51)
HDLC SERPL-MCNC: 46 MG/DL (ref 40–59)
HGB BLD-MCNC: 19 G/DL (ref 13–17.7)
HGB UR QL STRIP.AUTO: NEGATIVE
KETONES UR QL STRIP: NEGATIVE
LDLC SERPL CALC-MCNC: 99 MG/DL
LDLC/HDLC SERPL: 1.99 {RATIO} (ref 0–3.55)
LEUKOCYTE ESTERASE UR QL STRIP.AUTO: NEGATIVE
LYMPHOCYTES # BLD MANUAL: 3.15 10*3/MM3 (ref 0.7–3.1)
LYMPHOCYTES NFR BLD MANUAL: 9 % (ref 5–12)
MCH RBC QN AUTO: 30.8 PG (ref 26.6–33)
MCHC RBC AUTO-ENTMCNC: 35.4 G/DL (ref 31.5–35.7)
MCV RBC AUTO: 87 FL (ref 79–97)
MONOCYTES # BLD: 1.01 10*3/MM3 (ref 0.1–0.9)
NEUTROPHILS # BLD AUTO: 6.97 10*3/MM3 (ref 1.7–7)
NEUTROPHILS NFR BLD MANUAL: 61 % (ref 42.7–76)
NEUTS BAND NFR BLD MANUAL: 1 % (ref 0–5)
NITRITE UR QL STRIP: NEGATIVE
PH UR STRIP.AUTO: 5.5 [PH] (ref 5.5–8)
PLATELET # BLD AUTO: 239 10*3/MM3 (ref 140–450)
PMV BLD AUTO: 9.7 FL (ref 6–12)
POTASSIUM SERPL-SCNC: 4.2 MMOL/L (ref 3.4–5)
PROT SERPL-MCNC: 8.4 G/DL (ref 6.3–8.6)
PROT UR QL STRIP: ABNORMAL
RBC # BLD AUTO: 6.16 10*6/MM3 (ref 4.14–5.8)
RBC MORPH BLD: NORMAL
SMALL PLATELETS BLD QL SMEAR: ADEQUATE
SODIUM SERPL-SCNC: 138 MMOL/L (ref 137–145)
SP GR UR STRIP: >=1.03 (ref 1–1.03)
TRIGL SERPL-MCNC: 243 MG/DL
UROBILINOGEN UR QL STRIP: ABNORMAL
VARIANT LYMPHS NFR BLD MANUAL: 1 % (ref 0–5)
VARIANT LYMPHS NFR BLD MANUAL: 27 % (ref 19.6–45.3)
VLDLC SERPL-MCNC: 41 MG/DL (ref 5–40)
WBC MORPH BLD: NORMAL
WBC NRBC COR # BLD: 11.24 10*3/MM3 (ref 3.4–10.8)

## 2022-04-19 PROCEDURE — 84436 ASSAY OF TOTAL THYROXINE: CPT | Performed by: NURSE PRACTITIONER

## 2022-04-19 PROCEDURE — 83036 HEMOGLOBIN GLYCOSYLATED A1C: CPT | Performed by: NURSE PRACTITIONER

## 2022-04-19 PROCEDURE — 84481 FREE ASSAY (FT-3): CPT | Performed by: NURSE PRACTITIONER

## 2022-04-19 PROCEDURE — 36415 COLL VENOUS BLD VENIPUNCTURE: CPT | Performed by: NURSE PRACTITIONER

## 2022-04-19 PROCEDURE — 81003 URINALYSIS AUTO W/O SCOPE: CPT | Performed by: NURSE PRACTITIONER

## 2022-04-19 PROCEDURE — 80061 LIPID PANEL: CPT | Performed by: NURSE PRACTITIONER

## 2022-04-19 PROCEDURE — 85007 BL SMEAR W/DIFF WBC COUNT: CPT | Performed by: NURSE PRACTITIONER

## 2022-04-19 PROCEDURE — 80050 GENERAL HEALTH PANEL: CPT | Performed by: NURSE PRACTITIONER

## 2022-04-20 LAB
HBA1C MFR BLD: 7.9 % (ref 4.8–5.6)
T3FREE SERPL-MCNC: 3.29 PG/ML (ref 2–4.4)
T4 SERPL-MCNC: 7.78 MCG/DL (ref 4.5–11.7)
TSH SERPL DL<=0.05 MIU/L-ACNC: 1.47 UIU/ML (ref 0.27–4.2)

## 2022-04-25 ENCOUNTER — OFFICE VISIT (OUTPATIENT)
Dept: FAMILY MEDICINE CLINIC | Facility: CLINIC | Age: 57
End: 2022-04-25

## 2022-04-25 VITALS
BODY MASS INDEX: 31.92 KG/M2 | SYSTOLIC BLOOD PRESSURE: 130 MMHG | WEIGHT: 198.6 LBS | DIASTOLIC BLOOD PRESSURE: 86 MMHG | OXYGEN SATURATION: 98 % | HEART RATE: 86 BPM | HEIGHT: 66 IN

## 2022-04-25 DIAGNOSIS — E11.65 TYPE 2 DIABETES MELLITUS WITH HYPERGLYCEMIA, WITHOUT LONG-TERM CURRENT USE OF INSULIN: Primary | Chronic | ICD-10-CM

## 2022-04-25 DIAGNOSIS — I10 ESSENTIAL HYPERTENSION: Chronic | ICD-10-CM

## 2022-04-25 DIAGNOSIS — Z12.11 ENCOUNTER FOR SCREENING FOR MALIGNANT NEOPLASM OF COLON: ICD-10-CM

## 2022-04-25 DIAGNOSIS — E78.2 MIXED HYPERLIPIDEMIA: Chronic | ICD-10-CM

## 2022-04-25 PROCEDURE — 99214 OFFICE O/P EST MOD 30 MIN: CPT | Performed by: NURSE PRACTITIONER

## 2022-04-25 NOTE — PROGRESS NOTES
"Chief Complaint  Diabetes (6 month follow up)    Subjective          Sharan Gomez presents to [facility]  History of Present Illness  Pt here today for recheck of diabetes, htn, hyperlipidemia. Denies complaints of chest pain, shortness of breath or headache, denies myalgias. Does complain of fatigue and states he has gone back to working 5p to 5a and is drinking mountain dew to help him stay awake. Had labs and is here for those results. His a1c is not greatly improved from last labs. He does report that he is taking his medicine as prescribed.     Objective   Vital Signs:   /86   Pulse 86   Ht 167.6 cm (66\")   Wt 90.1 kg (198 lb 9.6 oz)   SpO2 98%   BMI 32.05 kg/m²     BMI is above normal parameters. Recommendations: exercise counseling/recommendations and nutrition counseling/recommendations       Physical Exam  Vitals and nursing note reviewed.   Constitutional:       General: He is not in acute distress.     Appearance: Normal appearance. He is not ill-appearing, toxic-appearing or diaphoretic.   HENT:      Head: Normocephalic and atraumatic.   Neck:      Vascular: No carotid bruit.   Cardiovascular:      Rate and Rhythm: Normal rate and regular rhythm.      Heart sounds: Normal heart sounds. No murmur heard.    No friction rub. No gallop.   Pulmonary:      Effort: Pulmonary effort is normal. No respiratory distress.      Breath sounds: Normal breath sounds. No stridor. No wheezing, rhonchi or rales.   Musculoskeletal:      Right lower leg: No edema.      Left lower leg: No edema.   Skin:     General: Skin is warm and dry.      Coloration: Skin is not jaundiced or pale.      Findings: No bruising, erythema, lesion or rash.   Neurological:      Mental Status: He is alert and oriented to person, place, and time.   Psychiatric:         Mood and Affect: Mood normal.         Behavior: Behavior normal.         Thought Content: Thought content " normal.         Judgment: Judgment normal.        Result Review :     Common labs    Common Labsle 9/17/21 9/17/21 9/17/21 9/17/21 9/17/21 4/19/22 4/19/22 4/19/22 4/19/22    1145 1145 1145 1145 1145 0955 0955 0955 0955   Glucose  132 (A)    193 (A)      BUN  15    10      Creatinine  1.02    1.02      eGFR Non African Am  76          Sodium  139    138      Potassium  3.5    4.2      Chloride  103    100 (A)      Calcium  9.1    9.3      Albumin  4.50    4.50      Total Bilirubin  2.7 (A)    1.3      Alkaline Phosphatase  89    106      AST (SGOT)  30    31      ALT (SGPT)  42    49      WBC 11.94 (A)       11.24 (A)    Hemoglobin 17.2       19.0 (A)    Hematocrit 48.7       53.6 (A)    Platelets 219       239    Total Cholesterol   157    186     Triglycerides   168 (A)    243 (A)     HDL Cholesterol   38 (A)    46     LDL Cholesterol    90    99     Hemoglobin A1C    7.82 (A)     7.90 (A)   Microalbumin, Urine     <1.2       (A) Abnormal value            CMP    CMP 9/17/21 4/19/22   Glucose 132 (A) 193 (A)   BUN 15 10   Creatinine 1.02 1.02   eGFR Non African Am 76    Sodium 139 138   Potassium 3.5 4.2   Chloride 103 100 (A)   Calcium 9.1 9.3   Albumin 4.50 4.50   Total Bilirubin 2.7 (A) 1.3   Alkaline Phosphatase 89 106   AST (SGOT) 30 31   ALT (SGPT) 42 49   (A) Abnormal value            CBC    CBC 9/17/21 4/19/22   WBC 11.94 (A) 11.24 (A)   RBC 5.49 6.16 (A)   Hemoglobin 17.2 19.0 (A)   Hematocrit 48.7 53.6 (A)   MCV 88.7 87.0   MCH 31.3 30.8   MCHC 35.3 35.4   RDW 12.4 12.4   Platelets 219 239   (A) Abnormal value            CBC w/diff    CBC w/Diff 9/17/21 4/19/22   WBC 11.94 (A) 11.24 (A)   RBC 5.49 6.16 (A)   Hemoglobin 17.2 19.0 (A)   Hematocrit 48.7 53.6 (A)   MCV 88.7 87.0   MCH 31.3 30.8   MCHC 35.3 35.4   RDW 12.4 12.4   Platelets 219 239   Neutrophil Rel % 62.8    Lymphocyte Rel % 23.0    Monocyte Rel % 11.6    Eosinophil Rel % 2.0    Basophil Rel % 0.6    (A) Abnormal value            Lipid Panel     Lipid Panel 9/17/21 4/19/22   Total Cholesterol 157 186   Triglycerides 168 (A) 243 (A)   HDL Cholesterol 38 (A) 46   VLDL Cholesterol 29 41 (A)   LDL Cholesterol  90 99   LDL/HDL Ratio 2.25 1.99   (A) Abnormal value            TSH    TSH 9/17/21 4/19/22   TSH 1.450 1.470           A1C Last 3 Results    HGBA1C Last 3 Results 9/17/21 4/19/22   Hemoglobin A1C 7.82 (A) 7.90 (A)   (A) Abnormal value                      Assessment and Plan    Diagnoses and all orders for this visit:    1. Type 2 diabetes mellitus with hyperglycemia, without long-term current use of insulin (HCC) (Primary)    2. Encounter for screening for malignant neoplasm of colon  -     Ambulatory Referral For Screening Colonoscopy    3. Essential hypertension    4. Mixed hyperlipidemia    he is referred again for colonoscopy. If he does not receive a call about an appointment within this week he will call me back. Lab results are discussed with him. We discussed adjusting his diabetic medications however he states he feels like he knows why his sugars are not improved (diet) and wants to make changes in his diet before adding or increasing diabetic medicines. He will follow up in 3 months for recheck or sooner if needed. Will call [facility] pharmacy to update covid vaccine information. Will call dr rey's office to obtain his most recent eye exam note. Will update health maintenance accordingly. All questions and concerns addressed with understanding noted. He is aware and is in agreement to this plan.     I spent 33 minutes caring for Sharan on this date of service. This time includes time spent by me in the following activities:preparing for the visit, reviewing tests, performing a medically appropriate examination and/or evaluation , counseling and educating the patient/family/caregiver, ordering medications, tests, or procedures and documenting information in the medical record  Follow Up   Return in about 3 months (around 7/25/2022), or if  symptoms worsen or fail to improve, for Recheck, or sooner as needed.  Patient was given instructions and counseling regarding his condition or for health maintenance advice. Please see specific information pulled into the AVS if appropriate.

## 2022-05-12 DIAGNOSIS — E11.00 TYPE 2 DIABETES MELLITUS WITH HYPEROSMOLARITY WITHOUT COMA, WITHOUT LONG-TERM CURRENT USE OF INSULIN: ICD-10-CM

## 2022-05-12 RX ORDER — GLIMEPIRIDE 4 MG/1
4 TABLET ORAL
Qty: 30 TABLET | Refills: 1 | Status: SHIPPED | OUTPATIENT
Start: 2022-05-12 | End: 2022-06-20

## 2022-06-15 DIAGNOSIS — E11.00 TYPE 2 DIABETES MELLITUS WITH HYPEROSMOLARITY WITHOUT COMA, WITHOUT LONG-TERM CURRENT USE OF INSULIN: Chronic | ICD-10-CM

## 2022-06-16 DIAGNOSIS — E11.00 TYPE 2 DIABETES MELLITUS WITH HYPEROSMOLARITY WITHOUT COMA, WITHOUT LONG-TERM CURRENT USE OF INSULIN: ICD-10-CM

## 2022-06-16 RX ORDER — LOSARTAN POTASSIUM 50 MG/1
TABLET ORAL
Qty: 30 TABLET | Refills: 2 | Status: SHIPPED | OUTPATIENT
Start: 2022-06-16 | End: 2022-08-16

## 2022-06-16 RX ORDER — EMPAGLIFLOZIN 25 MG/1
TABLET, FILM COATED ORAL
Qty: 30 TABLET | Refills: 2 | Status: SHIPPED | OUTPATIENT
Start: 2022-06-16 | End: 2022-08-16

## 2022-06-20 RX ORDER — ATORVASTATIN CALCIUM 20 MG/1
TABLET, FILM COATED ORAL
Qty: 30 TABLET | Refills: 5 | Status: SHIPPED | OUTPATIENT
Start: 2022-06-20 | End: 2023-01-18

## 2022-06-20 RX ORDER — GLIMEPIRIDE 4 MG/1
4 TABLET ORAL
Qty: 30 TABLET | Refills: 5 | Status: SHIPPED | OUTPATIENT
Start: 2022-06-20 | End: 2023-01-18

## 2022-06-23 ENCOUNTER — OFFICE VISIT (OUTPATIENT)
Dept: FAMILY MEDICINE CLINIC | Facility: CLINIC | Age: 57
End: 2022-06-23

## 2022-06-23 VITALS
OXYGEN SATURATION: 97 % | HEART RATE: 82 BPM | SYSTOLIC BLOOD PRESSURE: 142 MMHG | DIASTOLIC BLOOD PRESSURE: 80 MMHG | HEIGHT: 66 IN | WEIGHT: 203 LBS | BODY MASS INDEX: 32.62 KG/M2 | TEMPERATURE: 97.8 F

## 2022-06-23 DIAGNOSIS — M54.42 ACUTE LEFT-SIDED LOW BACK PAIN WITH LEFT-SIDED SCIATICA: ICD-10-CM

## 2022-06-23 DIAGNOSIS — M79.662 PAIN OF LEFT CALF: ICD-10-CM

## 2022-06-23 DIAGNOSIS — M54.17 LUMBOSACRAL RADICULOPATHY: Primary | ICD-10-CM

## 2022-06-23 PROCEDURE — 99214 OFFICE O/P EST MOD 30 MIN: CPT | Performed by: NURSE PRACTITIONER

## 2022-06-23 PROCEDURE — 96372 THER/PROPH/DIAG INJ SC/IM: CPT | Performed by: NURSE PRACTITIONER

## 2022-06-23 RX ORDER — TRIAMCINOLONE ACETONIDE 40 MG/ML
80 INJECTION, SUSPENSION INTRA-ARTICULAR; INTRAMUSCULAR ONCE
Status: COMPLETED | OUTPATIENT
Start: 2022-06-23 | End: 2022-06-23

## 2022-06-23 RX ORDER — CYCLOBENZAPRINE HCL 5 MG
TABLET ORAL
COMMUNITY
Start: 2022-06-22

## 2022-06-23 RX ORDER — IBUPROFEN 800 MG/1
800 TABLET ORAL EVERY 6 HOURS PRN
Qty: 60 TABLET | Refills: 0 | Status: SHIPPED | OUTPATIENT
Start: 2022-06-23 | End: 2023-04-03 | Stop reason: SDUPTHER

## 2022-06-23 RX ORDER — TRAMADOL HYDROCHLORIDE 50 MG/1
50 TABLET ORAL EVERY 6 HOURS PRN
Qty: 40 TABLET | Refills: 0 | Status: SHIPPED | OUTPATIENT
Start: 2022-06-23 | End: 2022-07-07 | Stop reason: SDUPTHER

## 2022-06-23 RX ADMIN — TRIAMCINOLONE ACETONIDE 80 MG: 40 INJECTION, SUSPENSION INTRA-ARTICULAR; INTRAMUSCULAR at 14:15

## 2022-06-23 NOTE — PROGRESS NOTES
"Chief Complaint  Hip Pain (Follow up from urgent care, left hip pain and radiated down the calve. Pinky toe and the side of leg are going numb)    Subjective        Sharan Gomez presents to [facility] [place]     The patient presents today for a follow-up from urgent care. He is accompanied by his wife today.    The patient reports that he has been experiencing muscle cramps in his left lower extremity, calf is the worse. He states that his toes will become numb. He had an ultrasound done yesterday and there was not a blockage in his leg. The patient reports that he had back pain that is not as severe. He does not have right lower extremity pain. Accompanied female reports that she can see his muscle \"twitching\" when he has a muscle cramp. He states that his cramping is worse while sitting and rates his pain a 10 out of 10. The patient reports that he will have to straighten his left leg while in the car because of the pain. He will have some relief while stretching his leg. He will have pain while laying on his back. He was given a muscle relaxer at urgent care. He does not have pain with ambulation. Denies recent injury. The patient reports that he had lab work done and his magnesium was mild low and his B12 was borderline normal. Accompanied female reports that patient took an opioid before and he had hallucinations. Accompanied female reports that patient had pneumonia and his doctor \"scraped his lungs\" because he believed it was fungal. He did not have further treatment for his pneumonia because it did not grow. Denies having a fever. He states that 06/21/2022, he was going somewhere and had to come home because of his muscle cramps. He has been taking ibuprofen.    History of Present Illness    Objective   Vital Signs:  /80   Pulse 82   Temp 97.8 °F (36.6 °C)   Ht 167.6 cm (66\")   Wt 92.1 kg (203 lb)   SpO2 97%   BMI 32.77 kg/m²   Estimated body " "mass index is 32.77 kg/m² as calculated from the following:    Height as of this encounter: 167.6 cm (66\").    Weight as of this encounter: 92.1 kg (203 lb).    BMI is >= 30 and <35. (Class 1 Obesity). The following options were offered after discussion;: exercise counseling/recommendations and nutrition counseling/recommendations      Physical Exam  Vitals and nursing note reviewed.   Constitutional:       General: He is in acute distress.      Appearance: Normal appearance. He is not ill-appearing, toxic-appearing or diaphoretic.   HENT:      Head: Normocephalic and atraumatic.   Cardiovascular:      Rate and Rhythm: Normal rate and regular rhythm.      Heart sounds: Normal heart sounds. No murmur heard.    No gallop.      Comments: Heart rate regular, no murmurs auscultated.  Pulmonary:      Effort: Pulmonary effort is normal. No respiratory distress.      Breath sounds: Normal breath sounds. No stridor. No wheezing, rhonchi or rales.      Comments: Lung fields clear, without wheezes, rhonchi, or rales auscultated.  Musculoskeletal:         General: Tenderness present.      Comments: He can barely sit in the chair without excruciating pain, mainly to the left lateral calf. He has a positive straight leg raise on the left. Reflexes is diminished at the knee on the left. He is able to raise. His left great toe standing gives him some relief. He does complain of some pain to the lumbar spine, but rates his pain 10 out of 10 on the pain scale, mainly to the left calf. Back pain is not as severe.   Skin:     General: Skin is warm and dry.   Neurological:      Mental Status: He is alert and oriented to person, place, and time.      Gait: Gait abnormal.   Psychiatric:         Mood and Affect: Mood normal.         Behavior: Behavior normal.         Thought Content: Thought content normal.         Judgment: Judgment normal.        Result Review :                Assessment and Plan   Diagnoses and all orders for this " visit:    1. Lumbosacral radiculopathy (Primary)  -     traMADol (Ultram) 50 MG tablet; Take 1 tablet by mouth Every 6 (Six) Hours As Needed for Moderate Pain .  Dispense: 40 tablet; Refill: 0  -     MRI Lumbar Spine With & Without Contrast; Future  -     XR Spine Lumbar 2 or 3 View  -     ibuprofen (ADVIL,MOTRIN) 800 MG tablet; Take 1 tablet by mouth Every 6 (Six) Hours As Needed for Mild Pain .  Dispense: 60 tablet; Refill: 0    2. Acute left-sided low back pain with left-sided sciatica  -     traMADol (Ultram) 50 MG tablet; Take 1 tablet by mouth Every 6 (Six) Hours As Needed for Moderate Pain .  Dispense: 40 tablet; Refill: 0  -     MRI Lumbar Spine With & Without Contrast; Future  -     XR Spine Lumbar 2 or 3 View  -     ibuprofen (ADVIL,MOTRIN) 800 MG tablet; Take 1 tablet by mouth Every 6 (Six) Hours As Needed for Mild Pain .  Dispense: 60 tablet; Refill: 0  -     triamcinolone acetonide (KENALOG-40) injection 80 mg    3. Pain of left calf  -     traMADol (Ultram) 50 MG tablet; Take 1 tablet by mouth Every 6 (Six) Hours As Needed for Moderate Pain .  Dispense: 40 tablet; Refill: 0  -     MRI Lumbar Spine With & Without Contrast; Future  -     XR Spine Lumbar 2 or 3 View  -     ibuprofen (ADVIL,MOTRIN) 800 MG tablet; Take 1 tablet by mouth Every 6 (Six) Hours As Needed for Mild Pain .  Dispense: 60 tablet; Refill: 0  -     triamcinolone acetonide (KENALOG-40) injection 80 mg    CINTHYA is obtained and reviewed. Ultram is prescribed for him. He will also be given ibuprofen 800 mg t.i.d. daily as needed for pain. Work excuse is given to him for the rest of this week. I will obtain L-spine  x-ray and L-spine MRI stat. I did not give him a Toradol injection in the office today because he has an allergy to Toradol, but can take NSAIDs well and has tolerated fine. I will follow up with him regarding these results and refer him as necessary. Urgent care records are reviewed including negative Doppler on left lower  extremity, which was obtained yesterday. His CBC results and the rest of his labs that were drawn yesterday are also reviewed. They are advised to go on to the ER if his symptoms worsen over the weekend. All questions and concerns are addressed with understanding noted. The patient is in agreement to above plan.    I spent 30 minutes caring for Mr. Gomez on this date of service. This time includes time spent by me in the following activities: preparing for the visit, reviewing tests, performing a medically appropriate examination and/or evaluation, counseling and educating the patient/family/caregiver, ordering medications, tests, or procedures and documenting information in the medical record       I spent 33 minutes caring for Sharan on this date of service. This time includes time spent by me in the following activities:preparing for the visit, reviewing tests, obtaining and/or reviewing a separately obtained history, performing a medically appropriate examination and/or evaluation , counseling and educating the patient/family/caregiver, ordering medications, tests, or procedures and documenting information in the medical record  Follow Up   No follow-ups on file.  Patient was given instructions and counseling regarding his condition or for health maintenance advice. Please see specific information pulled into the AVS if appropriate.     Transcribed from ambient dictation for BRYANT Guzman by Tegan Ivey.  06/23/22   16:12 CDT    Patient verbalized consent to the visit recording.  I have personally performed the services described in this document as transcribed by the above individual, and it is both accurate and complete.  BRYANT Guzman  6/30/2022  08:34 CDT

## 2022-06-27 DIAGNOSIS — M79.662 PAIN OF LEFT CALF: Primary | ICD-10-CM

## 2022-06-27 DIAGNOSIS — M54.17 LUMBOSACRAL RADICULOPATHY: ICD-10-CM

## 2022-06-27 DIAGNOSIS — M54.42 ACUTE LEFT-SIDED LOW BACK PAIN WITH LEFT-SIDED SCIATICA: ICD-10-CM

## 2022-06-30 ENCOUNTER — HOSPITAL ENCOUNTER (OUTPATIENT)
Dept: MRI IMAGING | Facility: HOSPITAL | Age: 57
Discharge: HOME | End: 2022-06-30
Admitting: NURSE PRACTITIONER

## 2022-06-30 DIAGNOSIS — M79.662 PAIN OF LEFT CALF: ICD-10-CM

## 2022-06-30 DIAGNOSIS — M54.42 ACUTE LEFT-SIDED LOW BACK PAIN WITH LEFT-SIDED SCIATICA: ICD-10-CM

## 2022-06-30 DIAGNOSIS — M54.17 LUMBOSACRAL RADICULOPATHY: ICD-10-CM

## 2022-06-30 PROCEDURE — 72148 MRI LUMBAR SPINE W/O DYE: CPT

## 2022-07-05 DIAGNOSIS — M54.42 ACUTE LEFT-SIDED LOW BACK PAIN WITH LEFT-SIDED SCIATICA: Primary | ICD-10-CM

## 2022-07-05 DIAGNOSIS — M54.17 LUMBOSACRAL RADICULOPATHY: ICD-10-CM

## 2022-07-07 DIAGNOSIS — M54.42 ACUTE LEFT-SIDED LOW BACK PAIN WITH LEFT-SIDED SCIATICA: ICD-10-CM

## 2022-07-07 DIAGNOSIS — M79.662 PAIN OF LEFT CALF: ICD-10-CM

## 2022-07-07 DIAGNOSIS — M54.17 LUMBOSACRAL RADICULOPATHY: ICD-10-CM

## 2022-07-07 RX ORDER — TRAMADOL HYDROCHLORIDE 50 MG/1
50 TABLET ORAL EVERY 6 HOURS PRN
Qty: 40 TABLET | Refills: 0 | Status: SHIPPED | OUTPATIENT
Start: 2022-07-07

## 2022-08-03 RX ORDER — BENZONATATE 200 MG/1
200 CAPSULE ORAL 3 TIMES DAILY PRN
Qty: 30 CAPSULE | Refills: 0 | Status: SHIPPED | OUTPATIENT
Start: 2022-08-03 | End: 2023-02-07

## 2022-08-16 DIAGNOSIS — E11.00 TYPE 2 DIABETES MELLITUS WITH HYPEROSMOLARITY WITHOUT COMA, WITHOUT LONG-TERM CURRENT USE OF INSULIN: Chronic | ICD-10-CM

## 2022-08-16 RX ORDER — LOSARTAN POTASSIUM 50 MG/1
TABLET ORAL
Qty: 30 TABLET | Refills: 2 | Status: SHIPPED | OUTPATIENT
Start: 2022-08-16 | End: 2022-11-15

## 2022-08-16 RX ORDER — DULAGLUTIDE 3 MG/.5ML
INJECTION, SOLUTION SUBCUTANEOUS
Qty: 4 ML | Refills: 4 | Status: SHIPPED | OUTPATIENT
Start: 2022-08-16 | End: 2023-04-03 | Stop reason: SDUPTHER

## 2022-08-16 RX ORDER — EMPAGLIFLOZIN 25 MG/1
TABLET, FILM COATED ORAL
Qty: 30 TABLET | Refills: 2 | Status: SHIPPED | OUTPATIENT
Start: 2022-08-16 | End: 2022-11-15

## 2022-11-15 DIAGNOSIS — E11.65 TYPE 2 DIABETES MELLITUS WITH HYPERGLYCEMIA, WITHOUT LONG-TERM CURRENT USE OF INSULIN: ICD-10-CM

## 2022-11-15 DIAGNOSIS — E11.00 TYPE 2 DIABETES MELLITUS WITH HYPEROSMOLARITY WITHOUT COMA, WITHOUT LONG-TERM CURRENT USE OF INSULIN: Chronic | ICD-10-CM

## 2022-11-15 DIAGNOSIS — E11.00 TYPE 2 DIABETES MELLITUS WITH HYPEROSMOLARITY WITHOUT COMA, WITHOUT LONG-TERM CURRENT USE OF INSULIN: Primary | ICD-10-CM

## 2022-11-15 RX ORDER — LOSARTAN POTASSIUM 50 MG/1
TABLET ORAL
Qty: 30 TABLET | Refills: 0 | Status: SHIPPED | OUTPATIENT
Start: 2022-11-15 | End: 2023-01-18

## 2022-11-15 RX ORDER — EMPAGLIFLOZIN 25 MG/1
TABLET, FILM COATED ORAL
Qty: 30 TABLET | Refills: 0 | Status: SHIPPED | OUTPATIENT
Start: 2022-11-15 | End: 2023-01-18

## 2022-12-12 ENCOUNTER — OFFICE VISIT (OUTPATIENT)
Dept: FAMILY MEDICINE CLINIC | Facility: CLINIC | Age: 57
End: 2022-12-12

## 2022-12-12 ENCOUNTER — LAB (OUTPATIENT)
Dept: LAB | Facility: OTHER | Age: 57
End: 2022-12-12

## 2022-12-12 VITALS
WEIGHT: 203 LBS | BODY MASS INDEX: 32.62 KG/M2 | HEART RATE: 70 BPM | DIASTOLIC BLOOD PRESSURE: 80 MMHG | HEIGHT: 66 IN | OXYGEN SATURATION: 98 % | SYSTOLIC BLOOD PRESSURE: 128 MMHG

## 2022-12-12 DIAGNOSIS — Z20.828 EXPOSURE TO THE FLU: ICD-10-CM

## 2022-12-12 DIAGNOSIS — J40 BRONCHITIS: Primary | ICD-10-CM

## 2022-12-12 DIAGNOSIS — R05.9 COUGH, UNSPECIFIED TYPE: ICD-10-CM

## 2022-12-12 LAB
FLUAV AG UPPER RESP QL IA.RAPID: NOT DETECTED
FLUBV AG UPPER RESP QL IA.RAPID: NOT DETECTED
SARS-COV-2 AG RESP QL IA.RAPID: NORMAL

## 2022-12-12 PROCEDURE — 87428 SARSCOV & INF VIR A&B AG IA: CPT | Performed by: NURSE PRACTITIONER

## 2022-12-12 PROCEDURE — 87426 SARSCOV CORONAVIRUS AG IA: CPT | Performed by: NURSE PRACTITIONER

## 2022-12-12 PROCEDURE — 99213 OFFICE O/P EST LOW 20 MIN: CPT | Performed by: NURSE PRACTITIONER

## 2022-12-12 RX ORDER — PREDNISONE 20 MG/1
20 TABLET ORAL 2 TIMES DAILY
Qty: 10 TABLET | Refills: 0 | Status: SHIPPED | OUTPATIENT
Start: 2022-12-12 | End: 2022-12-17

## 2022-12-12 RX ORDER — CEPHALEXIN 500 MG/1
500 CAPSULE ORAL 2 TIMES DAILY
Qty: 14 CAPSULE | Refills: 0 | Status: SHIPPED | OUTPATIENT
Start: 2022-12-12 | End: 2022-12-19

## 2022-12-12 RX ORDER — BROMPHENIRAMINE MALEATE, PSEUDOEPHEDRINE HYDROCHLORIDE, AND DEXTROMETHORPHAN HYDROBROMIDE 2; 30; 10 MG/5ML; MG/5ML; MG/5ML
5 SYRUP ORAL 4 TIMES DAILY PRN
Qty: 118 ML | Refills: 0 | Status: SHIPPED | OUTPATIENT
Start: 2022-12-12 | End: 2023-02-07

## 2022-12-12 NOTE — PROGRESS NOTES
"Chief Complaint  Cough (Possible flu/)    Subjective        Sharan Gomez presents to [facility] [place]  History of Present Illness  Patient here today with wife complaining of a dry cough for the last 2 weeks fatigue body aches but denies fever.  Thinks that he was initially exposed to flu but his symptoms are persisting.  Did not have flu shot, states COVID vaccines are up-to-date.  He has been using over-the-counter cough medicine with little to no relief of his symptoms.    Objective   Vital Signs:  /80   Pulse 70   Ht 167.6 cm (66\")   Wt 92.1 kg (203 lb)   SpO2 98%   BMI 32.77 kg/m²   Estimated body mass index is 32.77 kg/m² as calculated from the following:    Height as of this encounter: 167.6 cm (66\").    Weight as of this encounter: 92.1 kg (203 lb).    BMI is >= 30 and <35. (Class 1 Obesity). The following options were offered after discussion;: exercise counseling/recommendations and nutrition counseling/recommendations    Physical Exam  Vitals and nursing note reviewed.   Constitutional:       General: He is not in acute distress.     Appearance: Normal appearance. He is not ill-appearing, toxic-appearing or diaphoretic.   HENT:      Head: Normocephalic and atraumatic.      Right Ear: Hearing normal. Tympanic membrane is bulging.      Left Ear: Hearing normal. Tympanic membrane is bulging.      Nose: Congestion and rhinorrhea present.      Mouth/Throat:      Pharynx: Posterior oropharyngeal erythema present. No oropharyngeal exudate or uvula swelling.   Cardiovascular:      Rate and Rhythm: Normal rate and regular rhythm.      Heart sounds: Normal heart sounds. No murmur heard.    No friction rub. No gallop.   Pulmonary:      Effort: Pulmonary effort is normal. No respiratory distress.      Breath sounds: Normal breath sounds. No stridor. No wheezing, rhonchi or rales.   Musculoskeletal:      Cervical back: Neck supple. Tenderness present. "   Lymphadenopathy:      Cervical: Cervical adenopathy present.   Skin:     General: Skin is warm and dry.      Coloration: Skin is not jaundiced or pale.      Findings: No bruising, erythema, lesion or rash.   Neurological:      Mental Status: He is alert and oriented to person, place, and time.      Cranial Nerves: No cranial nerve deficit.      Motor: Weakness present.      Coordination: Coordination normal.      Gait: Gait normal.   Psychiatric:         Mood and Affect: Mood normal.         Behavior: Behavior normal.         Thought Content: Thought content normal.         Judgment: Judgment normal.        Result Review :                Assessment and Plan   Diagnoses and all orders for this visit:    1. Bronchitis (Primary)  -     cephalexin (Keflex) 500 MG capsule; Take 1 capsule by mouth 2 (Two) Times a Day for 7 days.  Dispense: 14 capsule; Refill: 0  -     predniSONE (DELTASONE) 20 MG tablet; Take 1 tablet by mouth 2 (Two) Times a Day for 5 days.  Dispense: 10 tablet; Refill: 0  -     brompheniramine-pseudoephedrine-DM 30-2-10 MG/5ML syrup; Take 5 mL by mouth 4 (Four) Times a Day As Needed for Congestion or Cough.  Dispense: 118 mL; Refill: 0    2. Cough, unspecified type  -     Covid-19 + Flu A&B AG, Veritor  -     brompheniramine-pseudoephedrine-DM 30-2-10 MG/5ML syrup; Take 5 mL by mouth 4 (Four) Times a Day As Needed for Congestion or Cough.  Dispense: 118 mL; Refill: 0    3. Exposure to the flu  -     Covid-19 + Flu A&B AG, Veritor    He is swabbed for COVID and flu and are both negative, informed of results, advised to use Bromfed which is prescribed for him today as needed and to hold any other over-the-counter medications except for plain Tylenol or plain Motrin for body aches.  He is also prescribed Keflex and prednisone as above.  If symptoms should persist or worsen he will return to clinic for follow-up.  He is given a work note for today and tomorrow.  All questions and concerns addressed with  understanding verbalized.  He is aware and in agreement to this plan.     I spent 25 minutes caring for Sharan on this date of service. This time includes time spent by me in the following activities:preparing for the visit, reviewing tests, performing a medically appropriate examination and/or evaluation , counseling and educating the patient/family/caregiver, ordering medications, tests, or procedures and documenting information in the medical record  Follow Up   Return if symptoms worsen or fail to improve, for Recheck, or sooner as needed.  Patient was given instructions and counseling regarding his condition or for health maintenance advice. Please see specific information pulled into the AVS if appropriate.

## 2023-01-18 DIAGNOSIS — E11.00 TYPE 2 DIABETES MELLITUS WITH HYPEROSMOLARITY WITHOUT COMA, WITHOUT LONG-TERM CURRENT USE OF INSULIN: ICD-10-CM

## 2023-01-18 RX ORDER — ATORVASTATIN CALCIUM 20 MG/1
TABLET, FILM COATED ORAL
Qty: 30 TABLET | Refills: 0 | Status: SHIPPED | OUTPATIENT
Start: 2023-01-18 | End: 2023-02-20

## 2023-01-18 RX ORDER — GLIMEPIRIDE 4 MG/1
4 TABLET ORAL
Qty: 30 TABLET | Refills: 0 | Status: SHIPPED | OUTPATIENT
Start: 2023-01-18 | End: 2023-02-20

## 2023-01-18 RX ORDER — EMPAGLIFLOZIN 25 MG/1
TABLET, FILM COATED ORAL
Qty: 30 TABLET | Refills: 0 | Status: SHIPPED | OUTPATIENT
Start: 2023-01-18 | End: 2023-02-20

## 2023-01-18 RX ORDER — LOSARTAN POTASSIUM 50 MG/1
TABLET ORAL
Qty: 30 TABLET | Refills: 0 | Status: SHIPPED | OUTPATIENT
Start: 2023-01-18 | End: 2023-02-20

## 2023-01-31 DIAGNOSIS — Z91.018 ALLERGY TO ALPHA-GAL: Primary | ICD-10-CM

## 2023-02-06 ENCOUNTER — LAB (OUTPATIENT)
Dept: LAB | Facility: OTHER | Age: 58
End: 2023-02-06
Payer: COMMERCIAL

## 2023-02-06 DIAGNOSIS — E11.65 TYPE 2 DIABETES MELLITUS WITH HYPERGLYCEMIA, WITHOUT LONG-TERM CURRENT USE OF INSULIN: ICD-10-CM

## 2023-02-06 DIAGNOSIS — Z91.018 ALLERGY TO ALPHA-GAL: ICD-10-CM

## 2023-02-06 LAB
ALBUMIN SERPL-MCNC: 4.4 G/DL (ref 3.5–5)
ALBUMIN/GLOB SERPL: 1.3 G/DL (ref 1.1–1.8)
ALP SERPL-CCNC: 90 U/L (ref 38–126)
ALT SERPL W P-5'-P-CCNC: 51 U/L
ANION GAP SERPL CALCULATED.3IONS-SCNC: 9 MMOL/L (ref 5–15)
AST SERPL-CCNC: 33 U/L (ref 17–59)
BASOPHILS # BLD AUTO: 0.07 10*3/MM3 (ref 0–0.2)
BASOPHILS NFR BLD AUTO: 0.7 % (ref 0–1.5)
BILIRUB SERPL-MCNC: 2.3 MG/DL (ref 0.2–1.3)
BILIRUB UR QL STRIP: NEGATIVE
BUN SERPL-MCNC: 13 MG/DL (ref 7–23)
BUN/CREAT SERPL: 10.9 (ref 7–25)
CALCIUM SPEC-SCNC: 8.6 MG/DL (ref 8.4–10.2)
CHLORIDE SERPL-SCNC: 101 MMOL/L (ref 101–112)
CHOLEST SERPL-MCNC: 138 MG/DL (ref 150–200)
CLARITY UR: CLEAR
CO2 SERPL-SCNC: 30 MMOL/L (ref 22–30)
COLOR UR: YELLOW
CREAT SERPL-MCNC: 1.19 MG/DL (ref 0.7–1.3)
DEPRECATED RDW RBC AUTO: 40.1 FL (ref 37–54)
EGFRCR SERPLBLD CKD-EPI 2021: 72.1 ML/MIN/1.73
EOSINOPHIL # BLD AUTO: 0.28 10*3/MM3 (ref 0–0.4)
EOSINOPHIL NFR BLD AUTO: 2.8 % (ref 0.3–6.2)
ERYTHROCYTE [DISTWIDTH] IN BLOOD BY AUTOMATED COUNT: 12.8 % (ref 12.3–15.4)
GLOBULIN UR ELPH-MCNC: 3.5 GM/DL (ref 2.3–3.5)
GLUCOSE SERPL-MCNC: 131 MG/DL (ref 70–99)
GLUCOSE UR STRIP-MCNC: ABNORMAL MG/DL
HBA1C MFR BLD: 7.5 % (ref 4.8–5.6)
HCT VFR BLD AUTO: 50.8 % (ref 37.5–51)
HDLC SERPL-MCNC: 39 MG/DL (ref 40–59)
HGB BLD-MCNC: 17.8 G/DL (ref 13–17.7)
HGB UR QL STRIP.AUTO: NEGATIVE
KETONES UR QL STRIP: NEGATIVE
LDLC SERPL CALC-MCNC: 74 MG/DL
LDLC/HDLC SERPL: 1.81 {RATIO} (ref 0–3.55)
LEUKOCYTE ESTERASE UR QL STRIP.AUTO: NEGATIVE
LYMPHOCYTES # BLD AUTO: 2.97 10*3/MM3 (ref 0.7–3.1)
LYMPHOCYTES NFR BLD AUTO: 29.2 % (ref 19.6–45.3)
MCH RBC QN AUTO: 30.4 PG (ref 26.6–33)
MCHC RBC AUTO-ENTMCNC: 35 G/DL (ref 31.5–35.7)
MCV RBC AUTO: 86.8 FL (ref 79–97)
MONOCYTES # BLD AUTO: 1.2 10*3/MM3 (ref 0.1–0.9)
MONOCYTES NFR BLD AUTO: 11.8 % (ref 5–12)
NEUTROPHILS NFR BLD AUTO: 5.65 10*3/MM3 (ref 1.7–7)
NEUTROPHILS NFR BLD AUTO: 55.5 % (ref 42.7–76)
NITRITE UR QL STRIP: NEGATIVE
PH UR STRIP.AUTO: 5.5 [PH] (ref 5.5–8)
PLATELET # BLD AUTO: 259 10*3/MM3 (ref 140–450)
PMV BLD AUTO: 9.6 FL (ref 6–12)
POTASSIUM SERPL-SCNC: 3.8 MMOL/L (ref 3.4–5)
PROT SERPL-MCNC: 7.9 G/DL (ref 6.3–8.6)
PROT UR QL STRIP: NEGATIVE
RBC # BLD AUTO: 5.85 10*6/MM3 (ref 4.14–5.8)
SODIUM SERPL-SCNC: 140 MMOL/L (ref 137–145)
SP GR UR STRIP: 1.02 (ref 1–1.03)
TRIGL SERPL-MCNC: 143 MG/DL
UROBILINOGEN UR QL STRIP: ABNORMAL
VLDLC SERPL-MCNC: 25 MG/DL (ref 5–40)
WBC NRBC COR # BLD: 10.17 10*3/MM3 (ref 3.4–10.8)

## 2023-02-06 PROCEDURE — 80061 LIPID PANEL: CPT | Performed by: NURSE PRACTITIONER

## 2023-02-06 PROCEDURE — 86003 ALLG SPEC IGE CRUDE XTRC EA: CPT | Performed by: NURSE PRACTITIONER

## 2023-02-06 PROCEDURE — 86008 ALLG SPEC IGE RECOMB EA: CPT | Performed by: NURSE PRACTITIONER

## 2023-02-06 PROCEDURE — 36415 COLL VENOUS BLD VENIPUNCTURE: CPT | Performed by: NURSE PRACTITIONER

## 2023-02-06 PROCEDURE — 80050 GENERAL HEALTH PANEL: CPT | Performed by: NURSE PRACTITIONER

## 2023-02-06 PROCEDURE — 84436 ASSAY OF TOTAL THYROXINE: CPT | Performed by: NURSE PRACTITIONER

## 2023-02-06 PROCEDURE — 81003 URINALYSIS AUTO W/O SCOPE: CPT | Performed by: NURSE PRACTITIONER

## 2023-02-06 PROCEDURE — 82785 ASSAY OF IGE: CPT | Performed by: NURSE PRACTITIONER

## 2023-02-06 PROCEDURE — 84481 FREE ASSAY (FT-3): CPT | Performed by: NURSE PRACTITIONER

## 2023-02-06 PROCEDURE — 83036 HEMOGLOBIN GLYCOSYLATED A1C: CPT | Performed by: NURSE PRACTITIONER

## 2023-02-07 ENCOUNTER — OFFICE VISIT (OUTPATIENT)
Dept: FAMILY MEDICINE CLINIC | Facility: CLINIC | Age: 58
End: 2023-02-07
Payer: COMMERCIAL

## 2023-02-07 VITALS
OXYGEN SATURATION: 98 % | WEIGHT: 200.2 LBS | TEMPERATURE: 97.5 F | SYSTOLIC BLOOD PRESSURE: 130 MMHG | HEART RATE: 80 BPM | BODY MASS INDEX: 32.17 KG/M2 | HEIGHT: 66 IN | DIASTOLIC BLOOD PRESSURE: 78 MMHG

## 2023-02-07 DIAGNOSIS — E11.65 TYPE 2 DIABETES MELLITUS WITH HYPERGLYCEMIA, WITHOUT LONG-TERM CURRENT USE OF INSULIN: Chronic | ICD-10-CM

## 2023-02-07 DIAGNOSIS — K21.9 GASTROESOPHAGEAL REFLUX DISEASE, UNSPECIFIED WHETHER ESOPHAGITIS PRESENT: Chronic | ICD-10-CM

## 2023-02-07 DIAGNOSIS — E66.09 CLASS 1 OBESITY DUE TO EXCESS CALORIES WITH SERIOUS COMORBIDITY AND BODY MASS INDEX (BMI) OF 32.0 TO 32.9 IN ADULT: Chronic | ICD-10-CM

## 2023-02-07 DIAGNOSIS — I10 PRIMARY HYPERTENSION: Primary | Chronic | ICD-10-CM

## 2023-02-07 DIAGNOSIS — D75.1 POLYCYTHEMIA: ICD-10-CM

## 2023-02-07 DIAGNOSIS — E78.2 MIXED HYPERLIPIDEMIA: Chronic | ICD-10-CM

## 2023-02-07 LAB
T3FREE SERPL-MCNC: 3.25 PG/ML (ref 2–4.4)
T4 SERPL-MCNC: 8.2 MCG/DL (ref 4.5–11.7)
TSH SERPL DL<=0.05 MIU/L-ACNC: 1.28 UIU/ML (ref 0.27–4.2)

## 2023-02-07 PROCEDURE — 99214 OFFICE O/P EST MOD 30 MIN: CPT | Performed by: NURSE PRACTITIONER

## 2023-02-07 NOTE — PROGRESS NOTES
"Chief Complaint  Diabetes (Follow up on labs)    Subjective        Sharan Gomez presents to [facility] [place]  History of Present Illness  Patient here today for recheck of hypertension hyperlipidemia diabetes GERD.  He has lost 3 pounds since last visit here.  States he continues on his medications and does not need refills on them today.  He had labs yesterday and is here for those results.  No complaints of chest pain headache shortness of breath or side effects of medication.  Does not smoke and never has.  He does report that his sister and uncle both have what he thinks is polycythemia and he has been told before that his blood is \"thick \".  He has never seen hematology for this before.    Objective   Vital Signs:  /78   Pulse 80   Temp 97.5 °F (36.4 °C)   Ht 167.6 cm (66\")   Wt 90.8 kg (200 lb 3.2 oz)   SpO2 98%   BMI 32.31 kg/m²   Estimated body mass index is 32.31 kg/m² as calculated from the following:    Height as of this encounter: 167.6 cm (66\").    Weight as of this encounter: 90.8 kg (200 lb 3.2 oz).     BMI is >= 30 and <35. (Class 1 Obesity). The following options were offered after discussion;: exercise counseling/recommendations and nutrition counseling/recommendations    Physical Exam  Vitals and nursing note reviewed.   Constitutional:       General: He is not in acute distress.     Appearance: Normal appearance. He is obese. He is not ill-appearing, toxic-appearing or diaphoretic.   HENT:      Head: Normocephalic and atraumatic.   Neck:      Vascular: No carotid bruit.   Cardiovascular:      Rate and Rhythm: Normal rate and regular rhythm.      Pulses: Normal pulses.      Heart sounds: Normal heart sounds. No murmur heard.    No friction rub. No gallop.   Pulmonary:      Effort: Pulmonary effort is normal. No respiratory distress.      Breath sounds: Normal breath sounds. No stridor. No wheezing, rhonchi or rales. "   Musculoskeletal:      Right lower leg: No edema.      Left lower leg: No edema.   Skin:     General: Skin is warm and dry.      Coloration: Skin is not jaundiced or pale.      Findings: No bruising, erythema, lesion or rash.   Neurological:      Mental Status: He is alert and oriented to person, place, and time.      Cranial Nerves: No cranial nerve deficit.      Motor: No weakness.      Coordination: Coordination normal.      Gait: Gait normal.   Psychiatric:         Mood and Affect: Mood normal.         Behavior: Behavior normal.         Thought Content: Thought content normal.         Judgment: Judgment normal.        Result Review :    CMP    CMP 4/19/22 6/22/22 2/6/23   Glucose 193 (A)  131 (A)   Glucose  221 (A)    BUN 10 14 13   Creatinine 1.02 1.2 1.19   eGFR 87.3  72.1   Sodium 138 136 140   Potassium 4.2 3.8 3.8   Chloride 100 (A) 102 101   Calcium 9.3 8.2 (A) 8.6   Total Protein 8.4 6.9 7.9   Albumin 4.50 3.7 4.4   Globulin 3.9 (A)  3.5   Total Bilirubin 1.3 1.30 (A) 2.3 (A)   Alkaline Phosphatase 106 81 90   AST (SGOT) 31 21 33   ALT (SGPT) 49 51 51 (A)   Albumin/Globulin Ratio 1.2  1.3   BUN/Creatinine Ratio 9.8  10.9   Anion Gap 9.0  9.0   (A) Abnormal value       Comments are available for some flowsheets but are not being displayed.           CBC    CBC 4/19/22 2/6/23   WBC 11.24 (A) 10.17   RBC 6.16 (A) 5.85 (A)   Hemoglobin 19.0 (A) 17.8 (A)   Hematocrit 53.6 (A) 50.8   MCV 87.0 86.8   MCH 30.8 30.4   MCHC 35.4 35.0   RDW 12.4 12.8   Platelets 239 259   (A) Abnormal value            CBC w/diff    CBC w/Diff 4/19/22 2/6/23   WBC 11.24 (A) 10.17   RBC 6.16 (A) 5.85 (A)   Hemoglobin 19.0 (A) 17.8 (A)   Hematocrit 53.6 (A) 50.8   MCV 87.0 86.8   MCH 30.8 30.4   MCHC 35.4 35.0   RDW 12.4 12.8   Platelets 239 259   Neutrophil Rel %  55.5   Lymphocyte Rel %  29.2   Monocyte Rel %  11.8   Eosinophil Rel %  2.8   Basophil Rel %  0.7   (A) Abnormal value            Lipid Panel    Lipid Panel 4/19/22 2/6/23    Total Cholesterol 186 138 (A)   Triglycerides 243 (A) 143   HDL Cholesterol 46 39 (A)   VLDL Cholesterol 41 (A) 25   LDL Cholesterol  99 74   LDL/HDL Ratio 1.99 1.81   (A) Abnormal value            TSH    TSH 4/19/22 2/6/23   TSH 1.470 1.280                    Assessment and Plan   Diagnoses and all orders for this visit:    1. Primary hypertension (Primary)    2. Mixed hyperlipidemia    3. Type 2 diabetes mellitus with hyperglycemia, without long-term current use of insulin (HCC)  -     Microalbumin / Creatinine Urine Ratio - Urine, Clean Catch    4. Class 1 obesity due to excess calories with serious comorbidity and body mass index (BMI) of 32.0 to 32.9 in adult  Comments:  improving    5. Gastroesophageal reflux disease, unspecified whether esophagitis present    6. Polycythemia  -     Ambulatory Referral to Hematology    Labs are discussed with him and copies are offered to him however he states he does not need due to being able to review through [facility].  He reports a family history of polycythemia and his hemoglobin and RBCs are elevated today, not as high as they were previously on labs drawn 9 months ago but he has not seen hematology for this.  I will go ahead and refer him due to his reported family history and CBC results.  He does not need refills on medications today but will call back when he does.  Encouraged him to follow a better diabetic diet and increase exercise and continue on Trulicity, Amaryl, Lipitor, Jardiance, losartan as prescribed previously.  He has been told recently that Trulicity is not available through his pharmacy but currently he has 2 months worth still at home.  If he is unable to obtain when needed that he will call me back and let me know and may need to change Trulicity to another medication at that point in time.  He will return in 6 months for recheck or sooner if needed.  Microalbumin due to be collected and can be obtained with next set of labs.  All questions and  concerns addressed with understanding verbalized.  He is aware and in agreement to this plan.     I spent 30 minutes caring for Sharan on this date of service. This time includes time spent by me in the following activities:preparing for the visit, reviewing tests, performing a medically appropriate examination and/or evaluation , counseling and educating the patient/family/caregiver, ordering medications, tests, or procedures, referring and communicating with other health care professionals  and documenting information in the medical record  Follow Up   Return in about 6 months (around 8/7/2023), or if symptoms worsen or fail to improve, for Recheck, or sooner as needed.  Patient was given instructions and counseling regarding his condition or for health maintenance advice. Please see specific information pulled into the AVS if appropriate.

## 2023-02-14 LAB
ALPHA-GAL IGE QN: 28 KU/L
BEEF IGE QN: 15.6 KU/L
CONV CLASS DESCRIPTION: ABNORMAL
IGE SERPL-ACNC: 596 IU/ML (ref 6–495)
LAMB IGE QN: 13.5 KU/L
PORK IGE QN: 8.45 KU/L

## 2023-02-15 RX ORDER — EPINEPHRINE 0.3 MG/.3ML
0.3 INJECTION SUBCUTANEOUS ONCE
Qty: 1 EACH | Refills: 0 | Status: SHIPPED | OUTPATIENT
Start: 2023-02-15 | End: 2023-02-15

## 2023-02-17 DIAGNOSIS — E11.00 TYPE 2 DIABETES MELLITUS WITH HYPEROSMOLARITY WITHOUT COMA, WITHOUT LONG-TERM CURRENT USE OF INSULIN: ICD-10-CM

## 2023-02-20 RX ORDER — EMPAGLIFLOZIN 25 MG/1
TABLET, FILM COATED ORAL
Qty: 30 TABLET | Refills: 0 | Status: SHIPPED | OUTPATIENT
Start: 2023-02-20 | End: 2023-04-03 | Stop reason: SDUPTHER

## 2023-02-20 RX ORDER — LOSARTAN POTASSIUM 50 MG/1
TABLET ORAL
Qty: 30 TABLET | Refills: 0 | Status: SHIPPED | OUTPATIENT
Start: 2023-02-20 | End: 2023-04-03 | Stop reason: SDUPTHER

## 2023-02-20 RX ORDER — ATORVASTATIN CALCIUM 20 MG/1
TABLET, FILM COATED ORAL
Qty: 30 TABLET | Refills: 0 | Status: SHIPPED | OUTPATIENT
Start: 2023-02-20

## 2023-02-20 RX ORDER — GLIMEPIRIDE 4 MG/1
4 TABLET ORAL
Qty: 30 TABLET | Refills: 0 | Status: SHIPPED | OUTPATIENT
Start: 2023-02-20 | End: 2023-04-03 | Stop reason: SDUPTHER

## 2023-04-03 DIAGNOSIS — M54.42 ACUTE LEFT-SIDED LOW BACK PAIN WITH LEFT-SIDED SCIATICA: ICD-10-CM

## 2023-04-03 DIAGNOSIS — M54.17 LUMBOSACRAL RADICULOPATHY: ICD-10-CM

## 2023-04-03 DIAGNOSIS — M79.662 PAIN OF LEFT CALF: ICD-10-CM

## 2023-04-03 DIAGNOSIS — E11.00 TYPE 2 DIABETES MELLITUS WITH HYPEROSMOLARITY WITHOUT COMA, WITHOUT LONG-TERM CURRENT USE OF INSULIN: ICD-10-CM

## 2023-04-03 RX ORDER — GLIMEPIRIDE 4 MG/1
4 TABLET ORAL
Qty: 30 TABLET | Refills: 5 | Status: SHIPPED | OUTPATIENT
Start: 2023-04-03

## 2023-04-03 RX ORDER — LOSARTAN POTASSIUM 50 MG/1
50 TABLET ORAL DAILY
Qty: 30 TABLET | Refills: 5 | Status: SHIPPED | OUTPATIENT
Start: 2023-04-03

## 2023-04-03 RX ORDER — IBUPROFEN 800 MG/1
800 TABLET ORAL EVERY 6 HOURS PRN
Qty: 60 TABLET | Refills: 0 | Status: SHIPPED | OUTPATIENT
Start: 2023-04-03

## 2023-04-03 RX ORDER — DULAGLUTIDE 3 MG/.5ML
3 INJECTION, SOLUTION SUBCUTANEOUS WEEKLY
Qty: 4 ML | Refills: 4 | Status: SHIPPED | OUTPATIENT
Start: 2023-04-03

## 2023-04-10 RX ORDER — ATORVASTATIN CALCIUM 20 MG/1
20 TABLET, FILM COATED ORAL DAILY
Qty: 30 TABLET | Refills: 5 | Status: SHIPPED | OUTPATIENT
Start: 2023-04-10

## 2023-04-24 ENCOUNTER — OFFICE VISIT (OUTPATIENT)
Dept: FAMILY MEDICINE CLINIC | Facility: CLINIC | Age: 58
End: 2023-04-24
Payer: COMMERCIAL

## 2023-04-24 ENCOUNTER — PRIOR AUTHORIZATION (OUTPATIENT)
Dept: FAMILY MEDICINE CLINIC | Facility: CLINIC | Age: 58
End: 2023-04-24
Payer: COMMERCIAL

## 2023-04-24 VITALS — WEIGHT: 200 LBS | BODY MASS INDEX: 32.14 KG/M2 | HEIGHT: 66 IN

## 2023-04-24 DIAGNOSIS — M54.17 LUMBOSACRAL RADICULOPATHY: ICD-10-CM

## 2023-04-24 DIAGNOSIS — M79.662 PAIN OF LEFT CALF: ICD-10-CM

## 2023-04-24 DIAGNOSIS — M54.42 ACUTE LEFT-SIDED LOW BACK PAIN WITH LEFT-SIDED SCIATICA: Primary | ICD-10-CM

## 2023-04-24 RX ORDER — TRAMADOL HYDROCHLORIDE 50 MG/1
50 TABLET ORAL EVERY 6 HOURS PRN
Qty: 60 TABLET | Refills: 0 | Status: SHIPPED | OUTPATIENT
Start: 2023-04-24

## 2023-04-24 RX ORDER — METHYLPREDNISOLONE 4 MG/1
TABLET ORAL
Qty: 1 EACH | Refills: 0 | Status: SHIPPED | OUTPATIENT
Start: 2023-04-24

## 2023-04-24 RX ORDER — EPINEPHRINE 0.3 MG/.3ML
INJECTION SUBCUTANEOUS
COMMUNITY
Start: 2023-02-15

## 2023-04-24 RX ORDER — CYCLOBENZAPRINE HCL 10 MG
10 TABLET ORAL 3 TIMES DAILY PRN
Qty: 30 TABLET | Refills: 0 | Status: SHIPPED | OUTPATIENT
Start: 2023-04-24

## 2023-04-24 NOTE — PROGRESS NOTES
"Chief Complaint  Back Pain (Radiating down back of leg into calf and down to toe)    Subjective        Sharan Gomez presents to [facility] [place]  History of Present Illness  Patient here today for telephone visit.  He is at home and I am in the office today.  He reports severe pain to the left buttock radiating down the left posterior leg causing a severe cramp to the left calf and radiating all the way down to his left fifth toe.  He states that he feels just like he did when he had a herniated disc last year.  At that point he was treated with pain medicine and steroids physical therapy and muscle relaxers and had x-rays and MRIs.  Was referred to neurosurgery at that time and advised no surgery was necessary.  He did improve with above treatment and reports of the same pain began yesterday to the point that he was unable to sleep last night.  He does state that he mowed yesterday but no other excessive activity over the weekend and no recent injury either.  Objective   Vital Signs:  Ht 167.6 cm (66\")   Wt 90.7 kg (200 lb)   BMI 32.28 kg/m²   Estimated body mass index is 32.28 kg/m² as calculated from the following:    Height as of this encounter: 167.6 cm (66\").    Weight as of this encounter: 90.7 kg (200 lb).       Physical Exam   Deferred, telephone visit  Result Review :      Data reviewed: Radiologic studies L-spine x-ray and MRI from June 2022 reviewed today         Assessment and Plan   Diagnoses and all orders for this visit:    1. Acute left-sided low back pain with left-sided sciatica (Primary)  -     cyclobenzaprine (FLEXERIL) 10 MG tablet; Take 1 tablet by mouth 3 (Three) Times a Day As Needed for Muscle Spasms.  Dispense: 30 tablet; Refill: 0  -     methylPREDNISolone (MEDROL) 4 MG dose pack; Take as directed on package instructions.  Dispense: 1 each; Refill: 0  -     traMADol (Ultram) 50 MG tablet; Take 1 tablet by mouth Every 6 (Six) Hours As " Needed for Moderate Pain.  Dispense: 60 tablet; Refill: 0  -     Ambulatory Referral to Physical Therapy Evaluate and treat    2. Pain of left calf  -     cyclobenzaprine (FLEXERIL) 10 MG tablet; Take 1 tablet by mouth 3 (Three) Times a Day As Needed for Muscle Spasms.  Dispense: 30 tablet; Refill: 0  -     methylPREDNISolone (MEDROL) 4 MG dose pack; Take as directed on package instructions.  Dispense: 1 each; Refill: 0  -     traMADol (Ultram) 50 MG tablet; Take 1 tablet by mouth Every 6 (Six) Hours As Needed for Moderate Pain.  Dispense: 60 tablet; Refill: 0  -     Ambulatory Referral to Physical Therapy Evaluate and treat    3. Lumbosacral radiculopathy  -     cyclobenzaprine (FLEXERIL) 10 MG tablet; Take 1 tablet by mouth 3 (Three) Times a Day As Needed for Muscle Spasms.  Dispense: 30 tablet; Refill: 0  -     methylPREDNISolone (MEDROL) 4 MG dose pack; Take as directed on package instructions.  Dispense: 1 each; Refill: 0  -     traMADol (Ultram) 50 MG tablet; Take 1 tablet by mouth Every 6 (Six) Hours As Needed for Moderate Pain.  Dispense: 60 tablet; Refill: 0  -     Ambulatory Referral to Physical Therapy Evaluate and treat    Fredo obtained and reviewed, Ultram refilled for him today, I will also get him back on muscle relaxer as needed and he is given Medrol Dosepak to take as directed.  He is referred on to physical therapy.  Is unable to get into the office today due to pain but if he is able to have offered to give him an injection of Toradol as well.  Can also reconsider L-spine x-ray and MRI depending on how his pain progresses and depending on if he can get into the office or not for these.  He is given a work excuse for 1 week and is advised no heavy lifting pushing pulling bending stooping, no lifting over 10 pounds.  We will follow-up here for recheck or go on to ER if symptoms become emergent.  Patient aware and in agreement to this plan.  All questions and concerns addressed with understanding  verbalized.     I spent 22 minutes caring for Sharan on this date of service. This time includes time spent by me in the following activities:preparing for the visit, reviewing tests, obtaining and/or reviewing a separately obtained history, counseling and educating the patient/family/caregiver, ordering medications, tests, or procedures, referring and communicating with other health care professionals  and documenting information in the medical record  Follow Up   Return if symptoms worsen or fail to improve, for Recheck, or sooner as needed.  Patient was given instructions and counseling regarding his condition or for health maintenance advice. Please see specific information pulled into the AVS if appropriate.     You have chosen to receive care through a telephone visit. Do you consent to use a telephone visit for your medical care today? yes

## 2023-04-24 NOTE — TELEPHONE ENCOUNTER
PA for Tramadol was submitted to covermymeds.com ref PA Duong [id number]. DX used Left sided low back pain with left side sciatica M54.42. Pain from left calf M79.662. Lumbosacral Radiculopathy M54.17.    PA for Tramadol is APPROVED. Effective until 10/21/2023. Patient is advised.

## 2023-05-03 DIAGNOSIS — M54.17 LUMBOSACRAL RADICULOPATHY: ICD-10-CM

## 2023-05-03 DIAGNOSIS — M54.42 ACUTE LEFT-SIDED LOW BACK PAIN WITH LEFT-SIDED SCIATICA: ICD-10-CM

## 2023-05-03 DIAGNOSIS — M79.662 PAIN OF LEFT CALF: ICD-10-CM

## 2023-05-03 RX ORDER — IBUPROFEN 800 MG/1
800 TABLET ORAL EVERY 6 HOURS PRN
Qty: 60 TABLET | Refills: 0 | Status: SHIPPED | OUTPATIENT
Start: 2023-05-03

## 2023-06-13 ENCOUNTER — OFFICE VISIT (OUTPATIENT)
Dept: FAMILY MEDICINE CLINIC | Facility: CLINIC | Age: 58
End: 2023-06-13
Payer: COMMERCIAL

## 2023-06-13 VITALS
HEART RATE: 78 BPM | DIASTOLIC BLOOD PRESSURE: 78 MMHG | SYSTOLIC BLOOD PRESSURE: 146 MMHG | OXYGEN SATURATION: 99 % | BODY MASS INDEX: 32.47 KG/M2 | WEIGHT: 202 LBS | HEIGHT: 66 IN

## 2023-06-13 DIAGNOSIS — M54.16 LUMBAR BACK PAIN WITH RADICULOPATHY AFFECTING LOWER EXTREMITY: Primary | ICD-10-CM

## 2023-06-13 DIAGNOSIS — I10 ESSENTIAL HYPERTENSION: Chronic | ICD-10-CM

## 2023-06-13 PROCEDURE — 99214 OFFICE O/P EST MOD 30 MIN: CPT | Performed by: NURSE PRACTITIONER

## 2023-06-13 PROCEDURE — 96372 THER/PROPH/DIAG INJ SC/IM: CPT | Performed by: NURSE PRACTITIONER

## 2023-06-13 RX ORDER — KETOROLAC TROMETHAMINE 30 MG/ML
30 INJECTION, SOLUTION INTRAMUSCULAR; INTRAVENOUS ONCE
Status: COMPLETED | OUTPATIENT
Start: 2023-06-13 | End: 2023-06-13

## 2023-06-13 RX ORDER — HYDROCODONE BITARTRATE AND ACETAMINOPHEN 7.5; 325 MG/1; MG/1
1 TABLET ORAL EVERY 6 HOURS PRN
Qty: 40 TABLET | Refills: 0 | Status: SHIPPED | OUTPATIENT
Start: 2023-06-13

## 2023-06-13 RX ORDER — TRIAMCINOLONE ACETONIDE 40 MG/ML
60 INJECTION, SUSPENSION INTRA-ARTICULAR; INTRAMUSCULAR ONCE
Status: COMPLETED | OUTPATIENT
Start: 2023-06-13 | End: 2023-06-13

## 2023-06-13 RX ADMIN — KETOROLAC TROMETHAMINE 30 MG: 30 INJECTION, SOLUTION INTRAMUSCULAR; INTRAVENOUS at 09:54

## 2023-06-13 RX ADMIN — TRIAMCINOLONE ACETONIDE 60 MG: 40 INJECTION, SUSPENSION INTRA-ARTICULAR; INTRAMUSCULAR at 09:55

## 2023-06-13 NOTE — PROGRESS NOTES
"Chief Complaint  Back Pain    Subjective        Sharan Gomez presents to [facility] [place]  Back Pain  This is a recurrent problem. The current episode started in the past 7 days. The problem occurs constantly. The problem has been gradually worsening since onset. The pain is present in the gluteal and sacro-iliac. The quality of the pain is described as burning and stabbing. The pain radiates to the left thigh. The pain is at a severity of 7/10. The pain is Worse during the night. The symptoms are aggravated by lying down. Stiffness is present All day. Associated symptoms include leg pain and paresthesias. Pertinent negatives include no abdominal pain, bladder incontinence, bowel incontinence, chest pain, dysuria, fever, headaches, numbness, paresis, pelvic pain, perianal numbness, tingling, weakness or weight loss.     Pt here today with complaints of uncontrolled left lower back pain with radiculopathy down his L posterior thigh, reports he has had similar pain in the past but at that time he was having intermittent pain and all the way down his L leg. Now pain is constant but only going down to his knee. He has seen dr hanson in the past , about 1 year ago , did not have surgery at that time. He continues with Physical therapy now. Has been doing PT for the last month or longer. States his pain began a few months ago but worsened in the last 1-2 weeks after he mowed his lawn. Reports LLE weak, denies numbness. Has used ice/heat, nsaids, muscle relaxers, tylenol without symptom relief. Rates pain as 10/10 on the pain scale currently.     Objective   Vital Sign  /78   Pulse 78   Ht 167.6 cm (66\")   Wt 91.6 kg (202 lb)   SpO2 99%   BMI 32.60 kg/m²   Estimated body mass index is 32.6 kg/m² as calculated from the following:    Height as of this encounter: 167.6 cm (66\").    Weight as of this encounter: 91.6 kg (202 lb).     Physical Exam  Vitals and " nursing note reviewed.   Constitutional:       General: He is in acute distress.      Appearance: Normal appearance. He is not ill-appearing, toxic-appearing or diaphoretic.   HENT:      Head: Normocephalic and atraumatic.   Cardiovascular:      Rate and Rhythm: Normal rate.   Pulmonary:      Effort: Pulmonary effort is normal.   Musculoskeletal:         General: Tenderness present.      Lumbar back: Tenderness and bony tenderness present. Decreased range of motion. Positive left straight leg raise test.   Skin:     General: Skin is warm and dry.   Neurological:      Mental Status: He is alert and oriented to person, place, and time.      Motor: Weakness present.      Coordination: Coordination normal.      Gait: Gait abnormal.      Comments: Pt ambulatory without assist but gait is very guarded, slowed, pain shaking at times during exam he states due to uncontrolled pain in L posterior thigh, L lower back    Psychiatric:         Mood and Affect: Mood normal.      Result Review :  The following data was reviewed by: BRYANT Guzman on 06/13/2023:    Data reviewed : Radiologic studies l/s MRI from 6-2022 reviewed today, L/S xray from today reviewed as well today          Assessment and Plan   Diagnoses and all orders for this visit:    1. Lumbar back pain with radiculopathy affecting lower extremity (Primary)  -     ketorolac (TORADOL) injection 30 mg  -     triamcinolone acetonide (KENALOG-40) injection 60 mg  -     HYDROcodone-acetaminophen (NORCO) 7.5-325 MG per tablet; Take 1 tablet by mouth Every 6 (Six) Hours As Needed for Moderate Pain.  Dispense: 40 tablet; Refill: 0  -     XR Spine Lumbar 2 or 3 View  -     MRI Lumbar Spine Without Contrast; Future    2. Essential hypertension    MRI l/s from June 2022 reviewed today. Advised good body mechanics, advised ice/heat to area, he is given toradol 30mg inj IM in office today and kenalog 60mg inj IM in office today and he tolerates both well. Will obtain l/s  xray today, MRI l/s is ordered. He is taken off of work for 2 weeks and will follow up here at that time for recheck, sooner if needed. Continue with PT if he can tolerate. Fredo is obtained and reviewed. Norco prescribed for him today and he is advised no working/driving on medicine. Advised NO NSAIDs for today since he is given toradol shot. Pt aware and in agreement to this plan. All questions and concerns addressed with understanding noted. He is going to call his wife to pick him up from the clinic today because of his severe pain. He is advised I would prefer he not drive home. He reports understanding and agreement.      I spent 25 minutes caring for Sharan on this date of service. This time includes time spent by me in the following activities:preparing for the visit, reviewing tests, performing a medically appropriate examination and/or evaluation , counseling and educating the patient/family/caregiver, ordering medications, tests, or procedures, and documenting information in the medical record  Follow Up   Return in about 2 weeks (around 6/27/2023), or if symptoms worsen or fail to improve, for Recheck, or sooner as needed.  Patient was given instructions and counseling regarding his condition or for health maintenance advice. Please see specific information pulled into the AVS if appropriate.       Answers submitted by the patient for this visit:  Primary Reason for Visit (Submitted on 6/13/2023)  What is the primary reason for your visit?: Back Pain

## 2023-08-21 RX ORDER — DULAGLUTIDE 3 MG/.5ML
3 INJECTION, SOLUTION SUBCUTANEOUS WEEKLY
Qty: 2 ML | Refills: 4 | Status: SHIPPED | OUTPATIENT
Start: 2023-08-21

## 2023-09-07 DIAGNOSIS — M79.662 PAIN OF LEFT CALF: ICD-10-CM

## 2023-09-07 DIAGNOSIS — M54.17 LUMBOSACRAL RADICULOPATHY: ICD-10-CM

## 2023-09-07 DIAGNOSIS — M54.42 ACUTE LEFT-SIDED LOW BACK PAIN WITH LEFT-SIDED SCIATICA: ICD-10-CM

## 2023-09-07 RX ORDER — IBUPROFEN 800 MG/1
800 TABLET ORAL EVERY 6 HOURS PRN
Qty: 60 TABLET | Refills: 0 | Status: SHIPPED | OUTPATIENT
Start: 2023-09-07

## 2023-09-22 DIAGNOSIS — E11.00 TYPE 2 DIABETES MELLITUS WITH HYPEROSMOLARITY WITHOUT COMA, WITHOUT LONG-TERM CURRENT USE OF INSULIN: ICD-10-CM

## 2023-09-25 DIAGNOSIS — E11.00 TYPE 2 DIABETES MELLITUS WITH HYPEROSMOLARITY WITHOUT COMA, WITHOUT LONG-TERM CURRENT USE OF INSULIN: ICD-10-CM

## 2023-09-25 RX ORDER — LOSARTAN POTASSIUM 50 MG/1
50 TABLET ORAL DAILY
Qty: 30 TABLET | Refills: 5 | Status: SHIPPED | OUTPATIENT
Start: 2023-09-25

## 2023-09-25 RX ORDER — GLIMEPIRIDE 4 MG/1
4 TABLET ORAL
Qty: 30 TABLET | Refills: 5 | Status: SHIPPED | OUTPATIENT
Start: 2023-09-25

## 2023-09-25 RX ORDER — ATORVASTATIN CALCIUM 20 MG/1
20 TABLET, FILM COATED ORAL DAILY
Qty: 30 TABLET | Refills: 5 | Status: SHIPPED | OUTPATIENT
Start: 2023-09-25

## 2023-09-25 RX ORDER — EMPAGLIFLOZIN 25 MG/1
TABLET, FILM COATED ORAL
Qty: 30 TABLET | Refills: 5 | Status: SHIPPED | OUTPATIENT
Start: 2023-09-25